# Patient Record
Sex: FEMALE | Race: WHITE | Employment: OTHER | URBAN - METROPOLITAN AREA
[De-identification: names, ages, dates, MRNs, and addresses within clinical notes are randomized per-mention and may not be internally consistent; named-entity substitution may affect disease eponyms.]

---

## 2018-02-14 ENCOUNTER — TELEPHONE (OUTPATIENT)
Dept: FAMILY MEDICINE CLINIC | Age: 63
End: 2018-02-14

## 2018-02-14 NOTE — TELEPHONE ENCOUNTER
Joni Becerra called to see if Dr. Connie Beltrán would be willing to see her as a new patient. Her daughter Dimitri Correa is a current patient of yours and highly recommended you as a PCP. Please let me know if it would be okay with you to schedule her as a new patient. Thank you.

## 2018-03-19 ENCOUNTER — OFFICE VISIT (OUTPATIENT)
Dept: FAMILY MEDICINE CLINIC | Age: 63
End: 2018-03-19

## 2018-03-19 VITALS
BODY MASS INDEX: 28.81 KG/M2 | HEIGHT: 61 IN | DIASTOLIC BLOOD PRESSURE: 82 MMHG | RESPIRATION RATE: 18 BRPM | OXYGEN SATURATION: 97 % | HEART RATE: 70 BPM | SYSTOLIC BLOOD PRESSURE: 134 MMHG | WEIGHT: 152.6 LBS

## 2018-03-19 DIAGNOSIS — I10 ESSENTIAL HYPERTENSION: ICD-10-CM

## 2018-03-19 DIAGNOSIS — M16.0 PRIMARY OSTEOARTHRITIS OF BOTH HIPS: ICD-10-CM

## 2018-03-19 DIAGNOSIS — E78.2 MIXED HYPERLIPIDEMIA: ICD-10-CM

## 2018-03-19 DIAGNOSIS — M48.061 SPINAL STENOSIS OF LUMBAR REGION WITHOUT NEUROGENIC CLAUDICATION: ICD-10-CM

## 2018-03-19 DIAGNOSIS — R53.83 FATIGUE, UNSPECIFIED TYPE: Primary | ICD-10-CM

## 2018-03-19 PROCEDURE — 99203 OFFICE O/P NEW LOW 30 MIN: CPT | Performed by: INTERNAL MEDICINE

## 2018-03-19 RX ORDER — FLUOXETINE 10 MG/1
10 TABLET, FILM COATED ORAL DAILY
COMMUNITY
End: 2018-12-21 | Stop reason: SDUPTHER

## 2018-03-19 ASSESSMENT — ENCOUNTER SYMPTOMS
GASTROINTESTINAL NEGATIVE: 1
ALLERGIC/IMMUNOLOGIC NEGATIVE: 1
RESPIRATORY NEGATIVE: 1
BACK PAIN: 1

## 2018-03-19 NOTE — PROGRESS NOTES
Popliteal pulses are 2+ on the right side, and 2+ on the left side. Dorsalis pedis pulses are 2+ on the right side, and 2+ on the left side. Posterior tibial pulses are 2+ on the right side, and 2+ on the left side. Pulmonary/Chest: Effort normal and breath sounds normal. No accessory muscle usage. No apnea, no tachypnea and no bradypnea. No respiratory distress. She has no decreased breath sounds. She has no wheezes. She has no rhonchi. She has no rales. She exhibits no tenderness. Musculoskeletal: Normal range of motion. She exhibits no edema or tenderness. Lymphadenopathy:     She has no cervical adenopathy. She has no axillary adenopathy. Neurological: She is alert and oriented to person, place, and time. She has normal strength and normal reflexes. She is not disoriented. She displays no atrophy and no tremor. No cranial nerve deficit or sensory deficit. She exhibits normal muscle tone. Coordination normal.   Skin: Skin is warm, dry and intact. No abrasion and no rash noted. She is not diaphoretic. No cyanosis or erythema. No pallor. Nails show no clubbing. Psychiatric: She has a normal mood and affect. Her speech is normal and behavior is normal. Judgment and thought content normal. Cognition and memory are normal.       Assessment:      Lumbar Spinal Stenosis. Chronic. Hld (Hyperlipidemia). Stable w/ diet. Htn (Hypertension). Good w/ diet and wt. Plan:      All above problems reviewed and the found to be unchanged except for the following :     Lumbar Spinal Stenosis. To Pain MD for established care. No med needs at present. Hld (Hyperlipidemia). Continue diet and exercise as tolerated. To lose 10 lbs      Htn (Hypertension). Home BP checks. Call if>140/90. Improve diet. Avoid caffeine and salt. Call if new c/o w/ meds.

## 2018-03-20 ENCOUNTER — TELEPHONE (OUTPATIENT)
Dept: FAMILY MEDICINE CLINIC | Age: 63
End: 2018-03-20

## 2018-03-20 ENCOUNTER — TELEPHONE (OUTPATIENT)
Dept: PAIN MANAGEMENT | Age: 63
End: 2018-03-20

## 2018-03-20 DIAGNOSIS — E78.2 MIXED HYPERLIPIDEMIA: ICD-10-CM

## 2018-03-20 DIAGNOSIS — R53.83 FATIGUE, UNSPECIFIED TYPE: ICD-10-CM

## 2018-03-20 DIAGNOSIS — I10 ESSENTIAL HYPERTENSION: Primary | ICD-10-CM

## 2018-03-23 ENCOUNTER — NURSE ONLY (OUTPATIENT)
Dept: FAMILY MEDICINE CLINIC | Age: 63
End: 2018-03-23

## 2018-03-23 DIAGNOSIS — I10 ESSENTIAL HYPERTENSION: ICD-10-CM

## 2018-03-23 DIAGNOSIS — R53.83 FATIGUE, UNSPECIFIED TYPE: ICD-10-CM

## 2018-03-23 DIAGNOSIS — E78.2 MIXED HYPERLIPIDEMIA: ICD-10-CM

## 2018-03-23 LAB
ALBUMIN SERPL-MCNC: 4.3 G/DL (ref 3.4–5)
ALP BLD-CCNC: 67 U/L (ref 40–129)
ALT SERPL-CCNC: 13 U/L (ref 10–40)
ANION GAP SERPL CALCULATED.3IONS-SCNC: 13 MMOL/L (ref 3–16)
AST SERPL-CCNC: 19 U/L (ref 15–37)
BASOPHILS ABSOLUTE: 0.1 K/UL (ref 0–0.2)
BASOPHILS RELATIVE PERCENT: 1 %
BILIRUB SERPL-MCNC: <0.2 MG/DL (ref 0–1)
BILIRUBIN DIRECT: <0.2 MG/DL (ref 0–0.3)
BILIRUBIN, INDIRECT: NORMAL MG/DL (ref 0–1)
BUN BLDV-MCNC: 15 MG/DL (ref 7–20)
CALCIUM SERPL-MCNC: 9.5 MG/DL (ref 8.3–10.6)
CHLORIDE BLD-SCNC: 102 MMOL/L (ref 99–110)
CHOLESTEROL, TOTAL: 279 MG/DL (ref 0–199)
CO2: 28 MMOL/L (ref 21–32)
CREAT SERPL-MCNC: 0.6 MG/DL (ref 0.6–1.2)
EOSINOPHILS ABSOLUTE: 0.2 K/UL (ref 0–0.6)
EOSINOPHILS RELATIVE PERCENT: 3.6 %
GFR AFRICAN AMERICAN: >60
GFR NON-AFRICAN AMERICAN: >60
GLUCOSE BLD-MCNC: 91 MG/DL (ref 70–99)
HCT VFR BLD CALC: 41.3 % (ref 36–48)
HDLC SERPL-MCNC: 70 MG/DL (ref 40–60)
HEMOGLOBIN: 13.8 G/DL (ref 12–16)
LDL CHOLESTEROL CALCULATED: 194 MG/DL
LYMPHOCYTES ABSOLUTE: 1.9 K/UL (ref 1–5.1)
LYMPHOCYTES RELATIVE PERCENT: 37.4 %
MCH RBC QN AUTO: 31.1 PG (ref 26–34)
MCHC RBC AUTO-ENTMCNC: 33.5 G/DL (ref 31–36)
MCV RBC AUTO: 92.7 FL (ref 80–100)
MONOCYTES ABSOLUTE: 0.4 K/UL (ref 0–1.3)
MONOCYTES RELATIVE PERCENT: 7.2 %
NEUTROPHILS ABSOLUTE: 2.6 K/UL (ref 1.7–7.7)
NEUTROPHILS RELATIVE PERCENT: 50.8 %
PDW BLD-RTO: 12.9 % (ref 12.4–15.4)
PLATELET # BLD: 252 K/UL (ref 135–450)
PMV BLD AUTO: 8.7 FL (ref 5–10.5)
POTASSIUM SERPL-SCNC: 4.2 MMOL/L (ref 3.5–5.1)
RBC # BLD: 4.46 M/UL (ref 4–5.2)
SODIUM BLD-SCNC: 143 MMOL/L (ref 136–145)
TOTAL PROTEIN: 6.4 G/DL (ref 6.4–8.2)
TRIGL SERPL-MCNC: 76 MG/DL (ref 0–150)
TSH SERPL DL<=0.05 MIU/L-ACNC: 3.46 UIU/ML (ref 0.27–4.2)
VLDLC SERPL CALC-MCNC: 15 MG/DL
WBC # BLD: 5.1 K/UL (ref 4–11)

## 2018-03-23 PROCEDURE — 36415 COLL VENOUS BLD VENIPUNCTURE: CPT | Performed by: INTERNAL MEDICINE

## 2018-03-26 RX ORDER — PRAVASTATIN SODIUM 20 MG
20 TABLET ORAL EVERY EVENING
Qty: 90 TABLET | Refills: 1 | Status: SHIPPED | OUTPATIENT
Start: 2018-03-26 | End: 2018-09-27 | Stop reason: SINTOL

## 2018-03-26 NOTE — TELEPHONE ENCOUNTER
----- Message from Mary Hobbs MD sent at 3/25/2018  1:02 PM EDT -----  Cholesterol is very high. Pravastatin 20 mg one daily. rechx labs 6 mo.  Only 1 of 2 statins not assoc w/ muscle pain but also weakest.

## 2018-04-03 ENCOUNTER — OFFICE VISIT (OUTPATIENT)
Dept: PAIN MANAGEMENT | Age: 63
End: 2018-04-03

## 2018-04-03 VITALS
HEART RATE: 80 BPM | WEIGHT: 153.4 LBS | BODY MASS INDEX: 28.96 KG/M2 | DIASTOLIC BLOOD PRESSURE: 84 MMHG | SYSTOLIC BLOOD PRESSURE: 139 MMHG | HEIGHT: 61 IN

## 2018-04-03 DIAGNOSIS — M25.559 CHRONIC HIP PAIN, UNSPECIFIED LATERALITY: ICD-10-CM

## 2018-04-03 DIAGNOSIS — M96.1 POSTLAMINECTOMY SYNDROME OF CERVICAL REGION: ICD-10-CM

## 2018-04-03 DIAGNOSIS — G89.29 CHRONIC HIP PAIN, UNSPECIFIED LATERALITY: ICD-10-CM

## 2018-04-03 DIAGNOSIS — G89.29 OTHER CHRONIC PAIN: ICD-10-CM

## 2018-04-03 DIAGNOSIS — M96.1 POSTLAMINECTOMY SYNDROME OF LUMBAR REGION: Primary | ICD-10-CM

## 2018-04-03 PROCEDURE — 3014F SCREEN MAMMO DOC REV: CPT | Performed by: ANESTHESIOLOGY

## 2018-04-03 PROCEDURE — 99204 OFFICE O/P NEW MOD 45 MIN: CPT | Performed by: ANESTHESIOLOGY

## 2018-04-03 PROCEDURE — 1036F TOBACCO NON-USER: CPT | Performed by: ANESTHESIOLOGY

## 2018-04-03 PROCEDURE — G8419 CALC BMI OUT NRM PARAM NOF/U: HCPCS | Performed by: ANESTHESIOLOGY

## 2018-04-03 PROCEDURE — G8427 DOCREV CUR MEDS BY ELIG CLIN: HCPCS | Performed by: ANESTHESIOLOGY

## 2018-04-03 PROCEDURE — 3017F COLORECTAL CA SCREEN DOC REV: CPT | Performed by: ANESTHESIOLOGY

## 2018-04-03 RX ORDER — GABAPENTIN 400 MG/1
400 CAPSULE ORAL 3 TIMES DAILY
Qty: 270 CAPSULE | Refills: 0 | Status: SHIPPED | OUTPATIENT
Start: 2018-04-03 | End: 2018-08-27 | Stop reason: SDUPTHER

## 2018-04-03 RX ORDER — TIZANIDINE 4 MG/1
4 TABLET ORAL 2 TIMES DAILY PRN
Qty: 60 TABLET | Refills: 1 | Status: SHIPPED | OUTPATIENT
Start: 2018-04-03 | End: 2018-08-28 | Stop reason: SDUPTHER

## 2018-04-03 ASSESSMENT — PATIENT HEALTH QUESTIONNAIRE - PHQ9
1. LITTLE INTEREST OR PLEASURE IN DOING THINGS: 0
SUM OF ALL RESPONSES TO PHQ QUESTIONS 1-9: 0
SUM OF ALL RESPONSES TO PHQ9 QUESTIONS 1 & 2: 0
2. FEELING DOWN, DEPRESSED OR HOPELESS: 0

## 2018-04-09 ENCOUNTER — TELEPHONE (OUTPATIENT)
Dept: FAMILY MEDICINE CLINIC | Age: 63
End: 2018-04-09

## 2018-04-12 ENCOUNTER — TELEPHONE (OUTPATIENT)
Dept: PAIN MANAGEMENT | Age: 63
End: 2018-04-12

## 2018-04-12 DIAGNOSIS — M47.896 OTHER OSTEOARTHRITIS OF SPINE, LUMBAR REGION: Primary | ICD-10-CM

## 2018-04-12 RX ORDER — MELOXICAM 7.5 MG/1
7.5 TABLET ORAL DAILY PRN
Qty: 30 TABLET | Refills: 2 | Status: SHIPPED | OUTPATIENT
Start: 2018-04-12 | End: 2018-06-11 | Stop reason: SDUPTHER

## 2018-04-16 ENCOUNTER — OFFICE VISIT (OUTPATIENT)
Dept: ORTHOPEDIC SURGERY | Age: 63
End: 2018-04-16

## 2018-04-16 VITALS
DIASTOLIC BLOOD PRESSURE: 92 MMHG | HEIGHT: 61 IN | WEIGHT: 150 LBS | HEART RATE: 75 BPM | BODY MASS INDEX: 28.32 KG/M2 | SYSTOLIC BLOOD PRESSURE: 159 MMHG

## 2018-04-16 DIAGNOSIS — Z96.643 HISTORY OF TOTAL HIP REPLACEMENT, BILATERAL: Primary | ICD-10-CM

## 2018-04-16 DIAGNOSIS — R52 PAIN: ICD-10-CM

## 2018-04-16 PROCEDURE — 1036F TOBACCO NON-USER: CPT | Performed by: ORTHOPAEDIC SURGERY

## 2018-04-16 PROCEDURE — G8419 CALC BMI OUT NRM PARAM NOF/U: HCPCS | Performed by: ORTHOPAEDIC SURGERY

## 2018-04-16 PROCEDURE — 3014F SCREEN MAMMO DOC REV: CPT | Performed by: ORTHOPAEDIC SURGERY

## 2018-04-16 PROCEDURE — 3017F COLORECTAL CA SCREEN DOC REV: CPT | Performed by: ORTHOPAEDIC SURGERY

## 2018-04-16 PROCEDURE — 99203 OFFICE O/P NEW LOW 30 MIN: CPT | Performed by: ORTHOPAEDIC SURGERY

## 2018-04-16 PROCEDURE — G8427 DOCREV CUR MEDS BY ELIG CLIN: HCPCS | Performed by: ORTHOPAEDIC SURGERY

## 2018-05-01 ENCOUNTER — TELEPHONE (OUTPATIENT)
Dept: FAMILY MEDICINE CLINIC | Age: 63
End: 2018-05-01

## 2018-06-11 DIAGNOSIS — M47.896 OTHER OSTEOARTHRITIS OF SPINE, LUMBAR REGION: ICD-10-CM

## 2018-06-11 RX ORDER — MELOXICAM 7.5 MG/1
15 TABLET ORAL DAILY PRN
Qty: 60 TABLET | Refills: 1 | Status: SHIPPED | OUTPATIENT
Start: 2018-06-11 | End: 2018-10-18 | Stop reason: SDUPTHER

## 2018-06-13 RX ORDER — TIZANIDINE 4 MG/1
4 TABLET ORAL 2 TIMES DAILY
Qty: 60 TABLET | Refills: 0 | Status: SHIPPED | OUTPATIENT
Start: 2018-06-13 | End: 2019-01-09

## 2018-06-13 RX ORDER — TIZANIDINE 4 MG/1
TABLET ORAL
Refills: 1 | COMMUNITY
Start: 2018-06-11 | End: 2018-06-13 | Stop reason: SDUPTHER

## 2018-08-28 DIAGNOSIS — M25.559 CHRONIC HIP PAIN, UNSPECIFIED LATERALITY: ICD-10-CM

## 2018-08-28 DIAGNOSIS — M96.1 POSTLAMINECTOMY SYNDROME OF LUMBAR REGION: ICD-10-CM

## 2018-08-28 DIAGNOSIS — G89.29 CHRONIC HIP PAIN, UNSPECIFIED LATERALITY: ICD-10-CM

## 2018-08-28 DIAGNOSIS — M96.1 POSTLAMINECTOMY SYNDROME OF CERVICAL REGION: ICD-10-CM

## 2018-08-28 RX ORDER — TIZANIDINE 4 MG/1
4 TABLET ORAL 2 TIMES DAILY PRN
Qty: 60 TABLET | Refills: 1 | Status: SHIPPED | OUTPATIENT
Start: 2018-08-28 | End: 2018-09-27 | Stop reason: ALTCHOICE

## 2018-09-04 ENCOUNTER — TELEPHONE (OUTPATIENT)
Dept: PAIN MANAGEMENT | Age: 63
End: 2018-09-04

## 2018-09-04 DIAGNOSIS — M25.559 CHRONIC HIP PAIN, UNSPECIFIED LATERALITY: ICD-10-CM

## 2018-09-04 DIAGNOSIS — M96.1 POSTLAMINECTOMY SYNDROME OF LUMBAR REGION: ICD-10-CM

## 2018-09-04 DIAGNOSIS — M96.1 POSTLAMINECTOMY SYNDROME OF CERVICAL REGION: ICD-10-CM

## 2018-09-04 DIAGNOSIS — G89.29 CHRONIC HIP PAIN, UNSPECIFIED LATERALITY: ICD-10-CM

## 2018-09-04 NOTE — TELEPHONE ENCOUNTER
MEDICATION ISSUES    ALL MEDICATION REFILLS NEED 48-HOUR ADVANCED NOTICE - Not filled on weekends or holidays.      Issue:  Medication Refill   Requesting Refill: Yes    Medication requested: Gabapentin   Reporting Side effects: No / If other, Describe: N/A   Last date filled: N/A   OARRS done: No   Follow-up Appointment date: No     Correct PHARMACY updated with patient: Yes, [facility]

## 2018-09-18 ENCOUNTER — TELEPHONE (OUTPATIENT)
Dept: FAMILY MEDICINE CLINIC | Age: 63
End: 2018-09-18

## 2018-09-18 DIAGNOSIS — E78.2 MIXED HYPERLIPIDEMIA: Primary | ICD-10-CM

## 2018-09-18 DIAGNOSIS — I10 ESSENTIAL HYPERTENSION: ICD-10-CM

## 2018-09-25 ENCOUNTER — NURSE ONLY (OUTPATIENT)
Dept: FAMILY MEDICINE CLINIC | Age: 63
End: 2018-09-25
Payer: MEDICARE

## 2018-09-25 DIAGNOSIS — I10 ESSENTIAL HYPERTENSION: ICD-10-CM

## 2018-09-25 DIAGNOSIS — E78.2 MIXED HYPERLIPIDEMIA: ICD-10-CM

## 2018-09-25 LAB
ALBUMIN SERPL-MCNC: 4.4 G/DL (ref 3.4–5)
ALP BLD-CCNC: 73 U/L (ref 40–129)
ALT SERPL-CCNC: 16 U/L (ref 10–40)
ANION GAP SERPL CALCULATED.3IONS-SCNC: 12 MMOL/L (ref 3–16)
AST SERPL-CCNC: 23 U/L (ref 15–37)
BILIRUB SERPL-MCNC: <0.2 MG/DL (ref 0–1)
BILIRUBIN DIRECT: <0.2 MG/DL (ref 0–0.3)
BILIRUBIN, INDIRECT: NORMAL MG/DL (ref 0–1)
BUN BLDV-MCNC: 14 MG/DL (ref 7–20)
CALCIUM SERPL-MCNC: 9.7 MG/DL (ref 8.3–10.6)
CHLORIDE BLD-SCNC: 103 MMOL/L (ref 99–110)
CHOLESTEROL, TOTAL: 288 MG/DL (ref 0–199)
CO2: 27 MMOL/L (ref 21–32)
CREAT SERPL-MCNC: 0.6 MG/DL (ref 0.6–1.2)
GFR AFRICAN AMERICAN: >60
GFR NON-AFRICAN AMERICAN: >60
GLUCOSE BLD-MCNC: 93 MG/DL (ref 70–99)
HCT VFR BLD CALC: 40.4 % (ref 36–48)
HDLC SERPL-MCNC: 75 MG/DL (ref 40–60)
HEMOGLOBIN: 13.5 G/DL (ref 12–16)
LDL CHOLESTEROL CALCULATED: 191 MG/DL
MCH RBC QN AUTO: 30.2 PG (ref 26–34)
MCHC RBC AUTO-ENTMCNC: 33.3 G/DL (ref 31–36)
MCV RBC AUTO: 90.7 FL (ref 80–100)
PDW BLD-RTO: 13.3 % (ref 12.4–15.4)
PHOSPHORUS: 4.2 MG/DL (ref 2.5–4.9)
PLATELET # BLD: 271 K/UL (ref 135–450)
PMV BLD AUTO: 8.5 FL (ref 5–10.5)
POTASSIUM SERPL-SCNC: 4.6 MMOL/L (ref 3.5–5.1)
RBC # BLD: 4.45 M/UL (ref 4–5.2)
SODIUM BLD-SCNC: 142 MMOL/L (ref 136–145)
TOTAL PROTEIN: 6.7 G/DL (ref 6.4–8.2)
TRIGL SERPL-MCNC: 109 MG/DL (ref 0–150)
TSH SERPL DL<=0.05 MIU/L-ACNC: 2.83 UIU/ML (ref 0.27–4.2)
VLDLC SERPL CALC-MCNC: 22 MG/DL
WBC # BLD: 5.1 K/UL (ref 4–11)

## 2018-09-25 PROCEDURE — 36415 COLL VENOUS BLD VENIPUNCTURE: CPT | Performed by: INTERNAL MEDICINE

## 2018-09-27 ENCOUNTER — OFFICE VISIT (OUTPATIENT)
Dept: FAMILY MEDICINE CLINIC | Age: 63
End: 2018-09-27
Payer: MEDICARE

## 2018-09-27 VITALS
WEIGHT: 152.8 LBS | HEART RATE: 86 BPM | DIASTOLIC BLOOD PRESSURE: 78 MMHG | SYSTOLIC BLOOD PRESSURE: 124 MMHG | HEIGHT: 61 IN | OXYGEN SATURATION: 98 % | BODY MASS INDEX: 28.85 KG/M2 | RESPIRATION RATE: 16 BRPM

## 2018-09-27 DIAGNOSIS — M48.061 SPINAL STENOSIS OF LUMBAR REGION WITHOUT NEUROGENIC CLAUDICATION: ICD-10-CM

## 2018-09-27 DIAGNOSIS — Z23 PNEUMOCOCCAL VACCINATION ADMINISTERED AT CURRENT VISIT: ICD-10-CM

## 2018-09-27 DIAGNOSIS — Z00.00 MEDICARE ANNUAL WELLNESS VISIT, SUBSEQUENT: Primary | ICD-10-CM

## 2018-09-27 DIAGNOSIS — I10 ESSENTIAL HYPERTENSION: ICD-10-CM

## 2018-09-27 DIAGNOSIS — Z12.11 COLON CANCER SCREENING: ICD-10-CM

## 2018-09-27 DIAGNOSIS — E28.39 ESTROGEN DEFICIENCY: ICD-10-CM

## 2018-09-27 DIAGNOSIS — Z00.00 ROUTINE GENERAL MEDICAL EXAMINATION AT A HEALTH CARE FACILITY: ICD-10-CM

## 2018-09-27 DIAGNOSIS — M16.0 PRIMARY OSTEOARTHRITIS OF BOTH HIPS: ICD-10-CM

## 2018-09-27 DIAGNOSIS — Z23 FLU VACCINE NEED: ICD-10-CM

## 2018-09-27 DIAGNOSIS — E78.2 MIXED HYPERLIPIDEMIA: ICD-10-CM

## 2018-09-27 PROCEDURE — 90686 IIV4 VACC NO PRSV 0.5 ML IM: CPT | Performed by: INTERNAL MEDICINE

## 2018-09-27 PROCEDURE — G0009 ADMIN PNEUMOCOCCAL VACCINE: HCPCS | Performed by: INTERNAL MEDICINE

## 2018-09-27 PROCEDURE — G0438 PPPS, INITIAL VISIT: HCPCS | Performed by: INTERNAL MEDICINE

## 2018-09-27 PROCEDURE — 90670 PCV13 VACCINE IM: CPT | Performed by: INTERNAL MEDICINE

## 2018-09-27 PROCEDURE — G0008 ADMIN INFLUENZA VIRUS VAC: HCPCS | Performed by: INTERNAL MEDICINE

## 2018-09-27 ASSESSMENT — PATIENT HEALTH QUESTIONNAIRE - PHQ9
SUM OF ALL RESPONSES TO PHQ QUESTIONS 1-9: 0
SUM OF ALL RESPONSES TO PHQ QUESTIONS 1-9: 0

## 2018-09-27 ASSESSMENT — LIFESTYLE VARIABLES
HOW OFTEN DO YOU HAVE SIX OR MORE DRINKS ON ONE OCCASION: 0
AUDIT-C TOTAL SCORE: 3
HOW MANY STANDARD DRINKS CONTAINING ALCOHOL DO YOU HAVE ON A TYPICAL DAY: 0
HOW OFTEN DO YOU HAVE A DRINK CONTAINING ALCOHOL: 3

## 2018-09-27 ASSESSMENT — ENCOUNTER SYMPTOMS
ALLERGIC/IMMUNOLOGIC NEGATIVE: 1
GASTROINTESTINAL NEGATIVE: 1
RESPIRATORY NEGATIVE: 1
EYES NEGATIVE: 1

## 2018-09-27 ASSESSMENT — ANXIETY QUESTIONNAIRES: GAD7 TOTAL SCORE: 1

## 2018-09-27 NOTE — PATIENT INSTRUCTIONS
All above problems reviewed and the found to be unchanged except for the following :     Medicare Annual Wellness Visit, Subsequent. Due for Prenar 13 and Flu shot. Rx for DEXA and Colonoscopy. Lumbar Spinal Stenosis. To pain MD. Regular exercise. Wt reduction. Primary Osteoarthritis of Both Hips. meds as needed. Hld (Hyperlipidemia). Will try Livalo 2 mg. Htn (Hypertension). Home BP . Call if >140/90           Personalized Preventive Plan for Adenike Killian - 9/27/2018  Medicare offers a range of preventive health benefits. Some of the tests and screenings are paid in full while other may be subject to a deductible, co-insurance, and/or copay. Some of these benefits include a comprehensive review of your medical history including lifestyle, illnesses that may run in your family, and various assessments and screenings as appropriate. After reviewing your medical record and screening and assessments performed today your provider may have ordered immunizations, labs, imaging, and/or referrals for you. A list of these orders (if applicable) as well as your Preventive Care list are included within your After Visit Summary for your review. Other Preventive Recommendations:    · A preventive eye exam performed by an eye specialist is recommended every 1-2 years to screen for glaucoma; cataracts, macular degeneration, and other eye disorders. · A preventive dental visit is recommended every 6 months. · Try to get at least 150 minutes of exercise per week or 10,000 steps per day on a pedometer . · Order or download the FREE \"Exercise & Physical Activity: Your Everyday Guide\" from [employer]. Call 3-917.732.2497 or search [employer] online. · You need 7442-2822 mg of calcium and 2092-0079 IU of vitamin D per day.  It is possible to meet your calcium requirement with diet alone, but a vitamin D supplement is usually necessary to meet this goal.  · When exposed to the sun, use a sunscreen that protects against both UVA and UVB radiation with an SPF of 30 or greater. Reapply every 2 to 3 hours or after sweating, drying off with a towel, or swimming. · Always wear a seat belt when traveling in a car. Always wear a helmet when riding a bicycle or motorcycle.

## 2018-09-27 NOTE — PROGRESS NOTES
tenderness and no frontal sinus tenderness. Mouth/Throat: Uvula is midline, oropharynx is clear and moist and mucous membranes are normal. No oropharyngeal exudate. Eyes: Pupils are equal, round, and reactive to light. Conjunctivae, EOM and lids are normal.   Fundoscopic exam:       The right eye shows no arteriolar narrowing, no AV nicking, no exudate, no hemorrhage and no papilledema. The right eye shows no red reflex and no venous pulsations. The left eye shows no arteriolar narrowing, no AV nicking, no exudate, no hemorrhage and no papilledema. The left eye shows no red reflex and no venous pulsations. Neck: Trachea normal, normal range of motion, full passive range of motion without pain and phonation normal. Neck supple. Normal carotid pulses, no hepatojugular reflux and no JVD present. Carotid bruit is not present. No tracheal deviation present. No thyromegaly present. Cardiovascular: Normal rate, regular rhythm, normal heart sounds, intact distal pulses and normal pulses. No extrasystoles are present. PMI is not displaced. Exam reveals no gallop and no friction rub. No murmur heard. Pulses:       Carotid pulses are 2+ on the right side, and 2+ on the left side. Radial pulses are 2+ on the right side, and 2+ on the left side. Femoral pulses are 2+ on the right side, and 2+ on the left side. Popliteal pulses are 2+ on the right side, and 2+ on the left side. Dorsalis pedis pulses are 2+ on the right side, and 2+ on the left side. Posterior tibial pulses are 2+ on the right side, and 2+ on the left side. Pulmonary/Chest: Effort normal and breath sounds normal. No stridor. No respiratory distress. She has no decreased breath sounds. She has no wheezes. She has no rhonchi. She has no rales. She exhibits no tenderness. Abdominal: Soft.  Normal appearance, normal aorta and bowel sounds are normal. She exhibits no shifting dullness, no distension, no pulsatile liver, no fluid wave, no abdominal bruit, no ascites, no pulsatile midline mass and no mass. There is no hepatosplenomegaly. There is no tenderness. There is no rebound, no guarding and no CVA tenderness. No hernia. Hernia confirmed negative in the ventral area. Genitourinary: No breast swelling, tenderness, discharge or bleeding. Pelvic exam was performed with patient supine. Genitourinary Comments: NE   Musculoskeletal: Normal range of motion. She exhibits tenderness (s/p bilateral hip relacement. mild pain R ankle. chronic back pain. ). She exhibits no edema. Lymphadenopathy:        Head (right side): No submental, no submandibular, no tonsillar, no preauricular, no posterior auricular and no occipital adenopathy present. Head (left side): No submental, no submandibular, no tonsillar, no preauricular, no posterior auricular and no occipital adenopathy present. She has no cervical adenopathy. She has no axillary adenopathy. Right: No inguinal, no supraclavicular and no epitrochlear adenopathy present. Left: No inguinal, no supraclavicular and no epitrochlear adenopathy present. Neurological: She is alert and oriented to person, place, and time. She has normal strength and normal reflexes. She is not disoriented. She displays no atrophy, no tremor and normal reflexes. No cranial nerve deficit or sensory deficit. She exhibits normal muscle tone. She displays a negative Romberg sign. She displays no seizure activity. Coordination and gait normal.   Reflex Scores:       Tricep reflexes are 2+ on the right side and 2+ on the left side. Bicep reflexes are 2+ on the right side and 2+ on the left side. Brachioradialis reflexes are 2+ on the right side and 2+ on the left side. Patellar reflexes are 2+ on the right side and 2+ on the left side. Achilles reflexes are 2+ on the right side and 2+ on the left side. Skin: Skin is warm, dry and intact.  No Intolerant to all other Statins. Cardiac risk>10%. Yes Britt aClvo MD   gabapentin (NEURONTIN) 400 MG capsule TAKE ONE CAPSULE BY MOUTH 3 TIMES A DAY Yes Ricardo De Paz MD   tiZANidine (ZANAFLEX) 4 MG tablet Take 1 tablet by mouth 2 times daily Yes Ricardo De Paz MD   meloxicam (MOBIC) 7.5 MG tablet Take 2 tablets by mouth daily as needed for Pain Yes Ricardo De Paz MD   FLUoxetine (PROZAC) 10 MG tablet Take 10 mg by mouth daily Yes Historical Provider, MD   aspirin 81 MG tablet Take 81 mg by mouth daily Yes Historical Provider, MD Garcia [place] [phone] MG TABS Take by mouth Yes Historical Provider, MD   Multiple Vitamins-Minerals (THERAPEUTIC MULTIVITAMIN-MINERALS) tablet Take 1 tablet by mouth daily Yes Historical Provider, MD Garcia Blind Oil 1000 MG CAPS Take 1 capsule by mouth daily Yes Historical Provider, MD     Past Medical History:   Diagnosis Date    Cancer ([facility] [address]     basal cell on left upper chest    Hemangioma     T10    Hyperlipidemia     Hypertension     Osteoarthritis     Shingles     Severe 1985 and 1989 back and right hip, right leg.      Past Surgical History:   Procedure Laterality Date    BREAST SURGERY      cyst right    ENDOMETRIAL ABLATION      uterine    FOOT SURGERY      neuroma left foot    KNEE ARTHROSCOPY      right    SHOULDER SURGERY Right     rotator cuff    SKIN CANCER EXCISION  October 2015    left anterior upper chest    SKIN CANCER EXCISION  2-2016    mid chest area   [facility] SURGERY  January 27 2014    lumbar laminectomy, Dr. Moustapha Waddell  April 2001    cervical     TUMOR REMOVAL      left rib     Family History   Problem Relation Age of Onset    Heart Disease Mother     Cancer Mother         cervix and parathyroid    Arthritis Mother     Heart Disease Father     Cancer Father         lung and brain mets    Arthritis Sister     Arthritis Brother        CareTeam (Including outside providers/suppliers regularly involved

## 2018-10-18 DIAGNOSIS — M47.896 OTHER OSTEOARTHRITIS OF SPINE, LUMBAR REGION: ICD-10-CM

## 2018-10-18 RX ORDER — MELOXICAM 7.5 MG/1
15 TABLET ORAL DAILY PRN
Qty: 60 TABLET | Refills: 1 | Status: SHIPPED | OUTPATIENT
Start: 2018-10-18 | End: 2018-10-23

## 2018-10-23 DIAGNOSIS — M47.896 OTHER OSTEOARTHRITIS OF SPINE, LUMBAR REGION: ICD-10-CM

## 2018-10-23 RX ORDER — MELOXICAM 15 MG/1
15 TABLET ORAL DAILY
Qty: 30 TABLET | Refills: 3 | Status: SHIPPED | OUTPATIENT
Start: 2018-10-23 | End: 2019-01-09 | Stop reason: SDUPTHER

## 2018-10-27 PROBLEM — Z00.00 MEDICARE ANNUAL WELLNESS VISIT, SUBSEQUENT: Status: RESOLVED | Noted: 2018-09-27 | Resolved: 2018-10-27

## 2018-11-13 ENCOUNTER — HOSPITAL ENCOUNTER (OUTPATIENT)
Dept: WOMENS IMAGING | Age: 63
Discharge: HOME | End: 2018-11-13
Payer: MEDICARE

## 2018-11-13 ENCOUNTER — TELEPHONE (OUTPATIENT)
Dept: FAMILY MEDICINE CLINIC | Age: 63
End: 2018-11-13

## 2018-11-13 DIAGNOSIS — Z12.31 ENCOUNTER FOR SCREENING MAMMOGRAM FOR BREAST CANCER: ICD-10-CM

## 2018-11-13 DIAGNOSIS — E28.39 ESTROGEN DEFICIENCY: ICD-10-CM

## 2018-11-13 PROCEDURE — 77080 DXA BONE DENSITY AXIAL: CPT

## 2018-11-13 PROCEDURE — 77067 SCR MAMMO BI INCL CAD: CPT

## 2018-11-13 PROCEDURE — 77063 BREAST TOMOSYNTHESIS BI: CPT

## 2018-11-13 NOTE — TELEPHONE ENCOUNTER
Patient stated that Dr. Jessica Garcia wanted her on a statin drug. - she said none of them were working and she started on Definigen - she said the  that sold it to her seemed knowledgeable about it. She would like to know if you would order her a cholesterol test for sometime in December to see if it is working as the  said it would.      Please advise [phone]

## 2018-11-13 NOTE — TELEPHONE ENCOUNTER
Red Rice Yeast has not been shown to decrease Cardiac and stroke risk. Cholesterol is very high. May try for 6 week and then rechx but will most likely require medication that has proven to decrease risk of MI and Stroke by ~50%.

## 2018-11-26 ENCOUNTER — TELEPHONE (OUTPATIENT)
Dept: FAMILY MEDICINE CLINIC | Age: 63
End: 2018-11-26

## 2018-11-26 DIAGNOSIS — E78.2 MIXED HYPERLIPIDEMIA: Primary | ICD-10-CM

## 2018-11-27 DIAGNOSIS — M25.559 CHRONIC HIP PAIN, UNSPECIFIED LATERALITY: ICD-10-CM

## 2018-11-27 DIAGNOSIS — M96.1 POSTLAMINECTOMY SYNDROME OF LUMBAR REGION: ICD-10-CM

## 2018-11-27 DIAGNOSIS — M96.1 POSTLAMINECTOMY SYNDROME OF CERVICAL REGION: ICD-10-CM

## 2018-11-27 DIAGNOSIS — G89.29 CHRONIC HIP PAIN, UNSPECIFIED LATERALITY: ICD-10-CM

## 2018-11-27 RX ORDER — GABAPENTIN 400 MG/1
CAPSULE ORAL
Qty: 270 CAPSULE | Refills: 0 | OUTPATIENT
Start: 2018-11-27 | End: 2019-02-25

## 2018-11-27 NOTE — TELEPHONE ENCOUNTER
MEDICATION ISSUES     Reported Issue:  Medication Refill    Medication Information     - Refill medication requested: Gabapentin      - Medication dosage and quantity: 400mg [id number]     - Reporting side effects, if any: No     - Other issues, if any: None      Controlled Substances     - OARRS done: NA     - Last refill date (per OARRS): n/a      - Follow-up Appointment date: None    Pharmacy Information      - PHARMACY updated with patient: NA     - PHARMACY updated in Chart: Yes      NOTE for Controlled Substances     PLEASE READ TO PATIENTS:    1. For routine refills: ALL MEDICATION REFILLS NEED 2 WORKING DAYS (48-HOUR) ADVANCED NOTICE - Not filled on weekends or holidays.     - Patient informed about the above policy:  NA    2. If switching or changing opioid pain medications -    PATIENTS NEED TO BRING OLD MEDICATION FOR PILL COUNT AND POSSIBLE DESTRUCTION - before new medication could be filled.      - Patient informed about the above policy:  NA

## 2018-11-27 NOTE — TELEPHONE ENCOUNTER
Pt dose not need a med refill. Pt states that she is not sure why the pharmacy keeps sending refill request for her. Pt states that she is will call and make an appt when she is almost out.

## 2018-11-28 DIAGNOSIS — G89.29 CHRONIC HIP PAIN, UNSPECIFIED LATERALITY: ICD-10-CM

## 2018-11-28 DIAGNOSIS — M96.1 POSTLAMINECTOMY SYNDROME OF CERVICAL REGION: ICD-10-CM

## 2018-11-28 DIAGNOSIS — M25.559 CHRONIC HIP PAIN, UNSPECIFIED LATERALITY: ICD-10-CM

## 2018-11-28 DIAGNOSIS — M96.1 POSTLAMINECTOMY SYNDROME OF LUMBAR REGION: ICD-10-CM

## 2018-11-28 RX ORDER — GABAPENTIN 400 MG/1
CAPSULE ORAL
Qty: 270 CAPSULE | Refills: 0 | OUTPATIENT
Start: 2018-11-28 | End: 2019-02-26

## 2018-12-11 ENCOUNTER — HOSPITAL ENCOUNTER (OUTPATIENT)
Dept: GENERAL RADIOLOGY | Age: 63
Discharge: HOME | End: 2018-12-11
Payer: MEDICARE

## 2018-12-11 ENCOUNTER — OFFICE VISIT (OUTPATIENT)
Dept: FAMILY MEDICINE CLINIC | Age: 63
End: 2018-12-11
Payer: MEDICARE

## 2018-12-11 VITALS
DIASTOLIC BLOOD PRESSURE: 60 MMHG | BODY MASS INDEX: 28.51 KG/M2 | SYSTOLIC BLOOD PRESSURE: 138 MMHG | WEIGHT: 151 LBS | HEIGHT: 61 IN | OXYGEN SATURATION: 98 % | HEART RATE: 107 BPM | RESPIRATION RATE: 16 BRPM

## 2018-12-11 DIAGNOSIS — G89.29 CHRONIC RIGHT SHOULDER PAIN: Primary | ICD-10-CM

## 2018-12-11 DIAGNOSIS — G89.29 CHRONIC RIGHT SHOULDER PAIN: ICD-10-CM

## 2018-12-11 DIAGNOSIS — M25.511 CHRONIC RIGHT SHOULDER PAIN: Primary | ICD-10-CM

## 2018-12-11 DIAGNOSIS — M25.511 CHRONIC RIGHT SHOULDER PAIN: ICD-10-CM

## 2018-12-11 PROCEDURE — G8482 FLU IMMUNIZE ORDER/ADMIN: HCPCS | Performed by: INTERNAL MEDICINE

## 2018-12-11 PROCEDURE — 99213 OFFICE O/P EST LOW 20 MIN: CPT | Performed by: INTERNAL MEDICINE

## 2018-12-11 PROCEDURE — G8427 DOCREV CUR MEDS BY ELIG CLIN: HCPCS | Performed by: INTERNAL MEDICINE

## 2018-12-11 PROCEDURE — 1036F TOBACCO NON-USER: CPT | Performed by: INTERNAL MEDICINE

## 2018-12-11 PROCEDURE — G8419 CALC BMI OUT NRM PARAM NOF/U: HCPCS | Performed by: INTERNAL MEDICINE

## 2018-12-11 PROCEDURE — 73030 X-RAY EXAM OF SHOULDER: CPT

## 2018-12-11 PROCEDURE — 3017F COLORECTAL CA SCREEN DOC REV: CPT | Performed by: INTERNAL MEDICINE

## 2018-12-11 ASSESSMENT — ENCOUNTER SYMPTOMS
RESPIRATORY NEGATIVE: 1
ALLERGIC/IMMUNOLOGIC NEGATIVE: 1

## 2018-12-13 ENCOUNTER — TELEPHONE (OUTPATIENT)
Dept: FAMILY MEDICINE CLINIC | Age: 63
End: 2018-12-13

## 2018-12-13 DIAGNOSIS — G89.29 CHRONIC LEFT SHOULDER PAIN: Primary | ICD-10-CM

## 2018-12-13 DIAGNOSIS — M25.512 CHRONIC LEFT SHOULDER PAIN: Primary | ICD-10-CM

## 2018-12-14 ENCOUNTER — NURSE ONLY (OUTPATIENT)
Dept: FAMILY MEDICINE CLINIC | Age: 63
End: 2018-12-14
Payer: MEDICARE

## 2018-12-14 DIAGNOSIS — E78.2 MIXED HYPERLIPIDEMIA: ICD-10-CM

## 2018-12-14 LAB
ALBUMIN SERPL-MCNC: 4.3 G/DL (ref 3.4–5)
ALP BLD-CCNC: 70 U/L (ref 40–129)
ALT SERPL-CCNC: 15 U/L (ref 10–40)
AST SERPL-CCNC: 20 U/L (ref 15–37)
BILIRUB SERPL-MCNC: 0.3 MG/DL (ref 0–1)
BILIRUBIN DIRECT: <0.2 MG/DL (ref 0–0.3)
BILIRUBIN, INDIRECT: NORMAL MG/DL (ref 0–1)
CHOLESTEROL, TOTAL: 294 MG/DL (ref 0–199)
HDLC SERPL-MCNC: 64 MG/DL (ref 40–60)
LDL CHOLESTEROL CALCULATED: 201 MG/DL
TOTAL PROTEIN: 6.7 G/DL (ref 6.4–8.2)
TRIGL SERPL-MCNC: 143 MG/DL (ref 0–150)
VLDLC SERPL CALC-MCNC: 29 MG/DL

## 2018-12-14 PROCEDURE — 36415 COLL VENOUS BLD VENIPUNCTURE: CPT | Performed by: INTERNAL MEDICINE

## 2018-12-17 ENCOUNTER — TELEPHONE (OUTPATIENT)
Dept: FAMILY MEDICINE CLINIC | Age: 63
End: 2018-12-17

## 2018-12-17 RX ORDER — ROSUVASTATIN CALCIUM 5 MG/1
5 TABLET, COATED ORAL NIGHTLY
Qty: 30 TABLET | Refills: 3 | Status: SHIPPED | OUTPATIENT
Start: 2018-12-17 | End: 2019-04-03 | Stop reason: SDUPTHER

## 2018-12-21 RX ORDER — FLUOXETINE 10 MG/1
10 TABLET, FILM COATED ORAL DAILY
Qty: 90 TABLET | Refills: 2 | Status: SHIPPED | OUTPATIENT
Start: 2018-12-21

## 2019-01-02 ENCOUNTER — OFFICE VISIT (OUTPATIENT)
Dept: ORTHOPEDIC SURGERY | Age: 64
End: 2019-01-02
Payer: MEDICARE

## 2019-01-02 VITALS — WEIGHT: 150 LBS | BODY MASS INDEX: 28.32 KG/M2 | HEIGHT: 61 IN

## 2019-01-02 DIAGNOSIS — M19.012 ARTHRITIS OF LEFT SHOULDER REGION: ICD-10-CM

## 2019-01-02 DIAGNOSIS — M25.512 LEFT SHOULDER PAIN, UNSPECIFIED CHRONICITY: Primary | ICD-10-CM

## 2019-01-02 PROCEDURE — 3017F COLORECTAL CA SCREEN DOC REV: CPT | Performed by: ORTHOPAEDIC SURGERY

## 2019-01-02 PROCEDURE — 1036F TOBACCO NON-USER: CPT | Performed by: ORTHOPAEDIC SURGERY

## 2019-01-02 PROCEDURE — G8427 DOCREV CUR MEDS BY ELIG CLIN: HCPCS | Performed by: ORTHOPAEDIC SURGERY

## 2019-01-02 PROCEDURE — 99213 OFFICE O/P EST LOW 20 MIN: CPT | Performed by: ORTHOPAEDIC SURGERY

## 2019-01-02 PROCEDURE — G8482 FLU IMMUNIZE ORDER/ADMIN: HCPCS | Performed by: ORTHOPAEDIC SURGERY

## 2019-01-02 PROCEDURE — G8419 CALC BMI OUT NRM PARAM NOF/U: HCPCS | Performed by: ORTHOPAEDIC SURGERY

## 2019-01-09 ENCOUNTER — OFFICE VISIT (OUTPATIENT)
Dept: PAIN MANAGEMENT | Age: 64
End: 2019-01-09
Payer: MEDICARE

## 2019-01-09 VITALS
HEIGHT: 61 IN | WEIGHT: 153 LBS | DIASTOLIC BLOOD PRESSURE: 80 MMHG | BODY MASS INDEX: 28.89 KG/M2 | HEART RATE: 102 BPM | SYSTOLIC BLOOD PRESSURE: 138 MMHG

## 2019-01-09 DIAGNOSIS — M96.1 POSTLAMINECTOMY SYNDROME OF LUMBAR REGION: ICD-10-CM

## 2019-01-09 DIAGNOSIS — G89.4 CHRONIC PAIN SYNDROME: ICD-10-CM

## 2019-01-09 DIAGNOSIS — M96.1 POSTLAMINECTOMY SYNDROME OF CERVICAL REGION: ICD-10-CM

## 2019-01-09 DIAGNOSIS — M13.0 POLYARTHROPATHY, MULTIPLE SITES: Primary | ICD-10-CM

## 2019-01-09 PROCEDURE — G8427 DOCREV CUR MEDS BY ELIG CLIN: HCPCS | Performed by: ANESTHESIOLOGY

## 2019-01-09 PROCEDURE — 1036F TOBACCO NON-USER: CPT | Performed by: ANESTHESIOLOGY

## 2019-01-09 PROCEDURE — G8482 FLU IMMUNIZE ORDER/ADMIN: HCPCS | Performed by: ANESTHESIOLOGY

## 2019-01-09 PROCEDURE — 99214 OFFICE O/P EST MOD 30 MIN: CPT | Performed by: ANESTHESIOLOGY

## 2019-01-09 PROCEDURE — 3017F COLORECTAL CA SCREEN DOC REV: CPT | Performed by: ANESTHESIOLOGY

## 2019-01-09 PROCEDURE — G8419 CALC BMI OUT NRM PARAM NOF/U: HCPCS | Performed by: ANESTHESIOLOGY

## 2019-01-09 RX ORDER — MELOXICAM 15 MG/1
15 TABLET ORAL DAILY
Qty: 90 TABLET | Refills: 1 | Status: SHIPPED | OUTPATIENT
Start: 2019-01-09 | End: 2019-12-05 | Stop reason: SDUPTHER

## 2019-01-09 RX ORDER — GABAPENTIN 400 MG/1
400 CAPSULE ORAL 3 TIMES DAILY
Qty: 270 CAPSULE | Refills: 1 | Status: SHIPPED | OUTPATIENT
Start: 2019-01-09 | End: 2019-06-11 | Stop reason: SDUPTHER

## 2019-01-09 ASSESSMENT — ENCOUNTER SYMPTOMS
BACK PAIN: 1
EYE PAIN: 0
NAUSEA: 0
WHEEZING: 0
VOMITING: 0
EYE REDNESS: 0
SHORTNESS OF BREATH: 0
CONSTIPATION: 0
ABDOMINAL PAIN: 0
COUGH: 0
EYE DISCHARGE: 0

## 2019-01-12 LAB
6-ACETYLMORPHINE: NOT DETECTED
7-AMINOCLONAZEPAM: NOT DETECTED
ALPHA-OH-ALPRAZOLAM: NOT DETECTED
ALPRAZOLAM: NOT DETECTED
AMPHETAMINE: NOT DETECTED
BARBITURATES: NOT DETECTED
BENZOYLECGONINE: NOT DETECTED
BUPRENORPHINE: NOT DETECTED
CARISOPRODOL: PRESENT
CLONAZEPAM: NOT DETECTED
CODEINE: NOT DETECTED
CREATININE URINE: 165.1 MG/DL (ref 20–400)
DIAZEPAM: NOT DETECTED
DRUGS EXPECTED: NORMAL
EER PAIN MGT DRUG PANEL, HIGH RES/EMIT U: NORMAL
ETHYL GLUCURONIDE: NOT DETECTED
FENTANYL: NOT DETECTED
HYDROCODONE: NOT DETECTED
HYDROMORPHONE: NOT DETECTED
LORAZEPAM: NOT DETECTED
MARIJUANA METABOLITE: NOT DETECTED
MDA: NOT DETECTED
MDEA: NOT DETECTED
MDMA URINE: NOT DETECTED
MEPERIDINE: NOT DETECTED
METHADONE: NOT DETECTED
METHAMPHETAMINE: NOT DETECTED
METHYLPHENIDATE: NOT DETECTED
MIDAZOLAM: NOT DETECTED
MORPHINE: NOT DETECTED
NORBUPRENORPHINE, FREE: NOT DETECTED
NORDIAZEPAM: NOT DETECTED
NORFENTANYL: NOT DETECTED
NORHYDROCODONE, URINE: NOT DETECTED
NOROXYCODONE: NOT DETECTED
NOROXYMORPHONE, URINE: NOT DETECTED
OXAZEPAM: NOT DETECTED
OXYCODONE: NOT DETECTED
OXYMORPHONE: NOT DETECTED
PAIN MANAGEMENT DRUG PANEL: NORMAL
PAIN MANAGEMENT DRUG PANEL: NORMAL
PCP: NOT DETECTED
PHENTERMINE: NOT DETECTED
PROPOXYPHENE: NOT DETECTED
TAPENTADOL, URINE: NOT DETECTED
TAPENTADOL-O-SULFATE, URINE: NOT DETECTED
TEMAZEPAM: NOT DETECTED
TRAMADOL: NOT DETECTED
ZOLPIDEM: NOT DETECTED

## 2019-01-16 ENCOUNTER — TELEPHONE (OUTPATIENT)
Dept: PAIN MANAGEMENT | Age: 64
End: 2019-01-16

## 2019-03-22 DIAGNOSIS — G89.29 CHRONIC HIP PAIN, UNSPECIFIED LATERALITY: ICD-10-CM

## 2019-03-22 DIAGNOSIS — M25.559 CHRONIC HIP PAIN, UNSPECIFIED LATERALITY: ICD-10-CM

## 2019-03-22 DIAGNOSIS — M96.1 POSTLAMINECTOMY SYNDROME OF CERVICAL REGION: ICD-10-CM

## 2019-03-22 DIAGNOSIS — M96.1 POSTLAMINECTOMY SYNDROME OF LUMBAR REGION: ICD-10-CM

## 2019-03-22 RX ORDER — TIZANIDINE 4 MG/1
4 TABLET ORAL 2 TIMES DAILY PRN
Qty: 60 TABLET | Refills: 1 | Status: SHIPPED | OUTPATIENT
Start: 2019-03-22 | End: 2019-12-05 | Stop reason: SDUPTHER

## 2019-04-03 RX ORDER — ROSUVASTATIN CALCIUM 5 MG/1
TABLET, COATED ORAL
Qty: 30 TABLET | Refills: 3 | Status: SHIPPED | OUTPATIENT
Start: 2019-04-03 | End: 2019-09-30

## 2019-05-07 ENCOUNTER — OFFICE VISIT (OUTPATIENT)
Dept: DERMATOLOGY | Age: 64
End: 2019-05-07
Payer: MEDICARE

## 2019-05-07 DIAGNOSIS — L57.8 DIFFUSE PHOTOAGING OF SKIN: ICD-10-CM

## 2019-05-07 DIAGNOSIS — D49.2 SOFT TISSUE NEOPLASM: ICD-10-CM

## 2019-05-07 DIAGNOSIS — D48.9 NEOPLASM OF UNCERTAIN BEHAVIOR: Primary | ICD-10-CM

## 2019-05-07 DIAGNOSIS — D22.9 MULTIPLE BENIGN MELANOCYTIC NEVI: ICD-10-CM

## 2019-05-07 DIAGNOSIS — L82.0 INFLAMED SEBORRHEIC KERATOSIS: ICD-10-CM

## 2019-05-07 DIAGNOSIS — Z85.828 HISTORY OF NONMELANOMA SKIN CANCER: ICD-10-CM

## 2019-05-07 DIAGNOSIS — Z78.9 NON-TOBACCO USER: ICD-10-CM

## 2019-05-07 PROCEDURE — 1123F ACP DISCUSS/DSCN MKR DOCD: CPT | Performed by: DERMATOLOGY

## 2019-05-07 PROCEDURE — 3017F COLORECTAL CA SCREEN DOC REV: CPT | Performed by: DERMATOLOGY

## 2019-05-07 PROCEDURE — G8427 DOCREV CUR MEDS BY ELIG CLIN: HCPCS | Performed by: DERMATOLOGY

## 2019-05-07 PROCEDURE — 4040F PNEUMOC VAC/ADMIN/RCVD: CPT | Performed by: DERMATOLOGY

## 2019-05-07 PROCEDURE — 99203 OFFICE O/P NEW LOW 30 MIN: CPT | Performed by: DERMATOLOGY

## 2019-05-07 PROCEDURE — 17110 DESTRUCTION B9 LES UP TO 14: CPT | Performed by: DERMATOLOGY

## 2019-05-07 PROCEDURE — G8399 PT W/DXA RESULTS DOCUMENT: HCPCS | Performed by: DERMATOLOGY

## 2019-05-07 PROCEDURE — 1036F TOBACCO NON-USER: CPT | Performed by: DERMATOLOGY

## 2019-05-07 PROCEDURE — G8419 CALC BMI OUT NRM PARAM NOF/U: HCPCS | Performed by: DERMATOLOGY

## 2019-05-07 PROCEDURE — 1090F PRES/ABSN URINE INCON ASSESS: CPT | Performed by: DERMATOLOGY

## 2019-05-07 PROCEDURE — 11102 TANGNTL BX SKIN SINGLE LES: CPT | Performed by: DERMATOLOGY

## 2019-05-07 RX ORDER — TIZANIDINE 4 MG/1
2 TABLET ORAL NIGHTLY
COMMUNITY
End: 2019-09-17 | Stop reason: SDUPTHER

## 2019-05-07 NOTE — PROGRESS NOTES
file    Intimate partner violence:     Fear of current or ex partner: Not on file     Emotionally abused: Not on file     Physically abused: Not on file     Forced sexual activity: Not on file   Other Topics Concern    Not on file   Social History Narrative    Not on file       Physical Examination     The following were examined and determined to be normal: Psych/Neuro, Scalp/hair, Head/face, Conjunctivae/eyelids, Gums/teeth/lips, Neck, Breast/axilla/chest, Abdomen, LUE, RLE, LLE and Nails/digits. Groin/buttocks. Areas covered by underwear garment(s) not examined. The following were examined and determined to be abnormal: Back and RUE. -General: NAD, well-nourished, well-developed. Total body skin exam performed, areas examined listed above:   1. Right proximal upper arm-0.5cm - 0.3 cm well-demarcated skin colored to pink with brown papule      2. Medial mid back- well-defined \"stuck-on\" verrucous tan-brown papule(s) w/ surrounding bright erythema  3. Scattered on the head,neck, trunk and extremities are multiple well-defined round and oval symmetric smoothly-bordered uniformly brown macules and papules. no change in size/shape/color of any lesions; no bleeding lesions. 4. Face, neck, chest, upper extremities-Scattered dyspigmentation with multiple lentigines and vascular change consistent with chronic sun exposure  5. Left superior back- 1.1cm soft skin colored nodule with mild tenderness on palpation  6. Left dorsal forearm- well healed scar at of prior SCC, no evidence of recurrence. Assessment and Plan     1. Neoplasm of uncertain behavior    2. Inflamed seborrheic keratosis    3. Multiple benign melanocytic nevi    4. Diffuse photoaging of skin    5. Soft tissue neoplasm    6. History of nonmelanoma skin cancer    7. Non-tobacco user        1. Neoplasm of uncertain behavior  DDx: dermatofibroma v. dermal nevus rule out atypia  -Discussed possible diagnosis. Patient agreeable to biopsy. Verbal consent obtained after risks (infection, bleeding, scar), benefits and alternatives explained. -Area(s) to be biopsied were marked with a surgical pen. Site(s) were cleansed with alcohol. Local anesthesia achieved with 1% lidocaine with epinephrine/sodium bicarbonate. Shave biopsy performed with a razor blade. Hemostasis was achieved with aluminum chloride and electrodessication. The wound(s) were dressed with petrolatum and covered with a bandage. Specimen(s) sent to pathology. Pt educated re: risk of bleeding, infection, scar, discoloration, and wound care instructions. - [id number] TANGENTIAL BIOPSY SKIN SINGLE LESION    2. Inflamed seborrheic keratosis  Patient educated that seborrheic keratoses have no malignant potential, due to localized irritation/discomfort, pt elects for cryotherapy treatment today:   -1 lesion(s) treated w/ liquid nitrogen x 1cycles - 3 seconds each. Edu re: risk of blister formation, discomfort, scar, hypopigmentation. Discussed wound care and instructions given to take home. - [place] DESTRUCTION BENIGN LESIONS UP TO 14    3. Multiple benign melanocytic nevi  Benign acquired melanocytic nevi  -Recommend monthly self skin exams   -Educated regarding the ABCDEs of melanoma detection   -Call for any new/changing moles or concerning lesions  -Reviewed sun protective behavior -- sun avoidance during the peak hours of the day, sun-protective clothing (including hat and sunglasses), sunscreen use (water resistant, broad spectrum, SPF at least 30, need for reapplication every 2 to 3 hours), avoidance of tanning beds   -Plan: Observation with annual skin checks (earlier if indicated) performed in office to monitor current nevi and to assess for new lesions.     4. Diffuse photoaging of skin  -Patient's skin showing evidence of chronic sun exposure leading to diffuse photodamage and photo-aging  -Educated patient that chronic sun exposure leads to increased risk for skin cancers and to have

## 2019-05-09 LAB — DERMATOLOGY PATHOLOGY REPORT: NORMAL

## 2019-05-13 ENCOUNTER — TELEPHONE (OUTPATIENT)
Dept: DERMATOLOGY | Age: 64
End: 2019-05-13

## 2019-05-13 NOTE — TELEPHONE ENCOUNTER
----- Message from Ele Evans DO sent at 5/13/2019  1:27 PM EDT -----  Benign mole, no further treatment needed.  Please notify pt of result(s)

## 2019-06-11 ENCOUNTER — TELEPHONE (OUTPATIENT)
Dept: DERMATOLOGY | Age: 64
End: 2019-06-11

## 2019-06-11 NOTE — TELEPHONE ENCOUNTER
Patient states she's currently taking Meloxicam (coated aspirin)-low dose    Should she discontinue this medication before her procedure on Thursday, 0613/19.     Please let her know [phone]

## 2019-06-11 NOTE — TELEPHONE ENCOUNTER
Please encourage patient to get permission to stop meloxicam from her prescribing physician. If the medication is medically necessary, then she should continue it.

## 2019-06-13 ENCOUNTER — PROCEDURE VISIT (OUTPATIENT)
Dept: DERMATOLOGY | Age: 64
End: 2019-06-13
Payer: MEDICARE

## 2019-06-13 VITALS
DIASTOLIC BLOOD PRESSURE: 77 MMHG | WEIGHT: 148.8 LBS | HEART RATE: 75 BPM | SYSTOLIC BLOOD PRESSURE: 127 MMHG | BODY MASS INDEX: 28.12 KG/M2

## 2019-06-13 DIAGNOSIS — D49.2 SOFT TISSUE NEOPLASM: Primary | ICD-10-CM

## 2019-06-13 PROCEDURE — 11404 EXC TR-EXT B9+MARG 3.1-4 CM: CPT | Performed by: DERMATOLOGY

## 2019-06-13 PROCEDURE — 12032 INTMD RPR S/A/T/EXT 2.6-7.5: CPT | Performed by: DERMATOLOGY

## 2019-06-13 RX ORDER — DOXYCYCLINE HYCLATE 100 MG
TABLET ORAL
Qty: 20 TABLET | Refills: 0 | Status: SHIPPED | OUTPATIENT
Start: 2019-06-13 | End: 2019-09-30 | Stop reason: ALTCHOICE

## 2019-06-13 NOTE — PROGRESS NOTES
SURGERY      cyst right    ENDOMETRIAL ABLATION      uterine    FOOT SURGERY      neuroma left foot    KNEE ARTHROSCOPY      right    SHOULDER SURGERY Right     rotator cuff    SKIN CANCER EXCISION  October 2015    left anterior upper chest    SKIN CANCER EXCISION  2-2016    mid chest area   [facility] SURGERY  January 27 2014    lumbar laminectomy, Dr. Mitzi Conklin  April 2001    cervical     TUMOR REMOVAL      left rib       Allergies   Allergen Reactions    Lipitor      Muscle aches    Tramadol      Drug interaction w/mobic/fluoxetine    Adhesive Tape Rash     Outpatient Medications Marked as Taking for the 6/13/19 encounter (Procedure visit) with Guilherme Barba, DO   Medication Sig Dispense Refill    doxycycline hyclate (VIBRA-TABS) 100 MG tablet Take 1 tablet p.o. with food and a full glass of water for 10 days, do not lie flat for 1 hour afterwards. Wear sunscreen daily. 20 tablet 0    gabapentin (NEURONTIN) 400 MG capsule Take 1 capsule by mouth 3 times daily for 90 days.  270 capsule 0    tiZANidine (ZANAFLEX) 4 MG tablet Take 2 mg by mouth nightly      Mag Oxide-Vit D3-Turmeric (MAGNESIUM-VITAMIN D3-TURMERIC PO) Take by mouth      FLUoxetine (PROZAC) 10 MG tablet Take 1 tablet by mouth daily (Patient taking differently: Take 10 mg by mouth daily Indications: taking 1/2 of tablet daily ) 90 tablet 2    Glucosa-Chondr-Na Chondr--[phone] MG TABS Take by mouth Indications: 2 tablets daily       Multiple Vitamins-Minerals (THERAPEUTIC MULTIVITAMIN-MINERALS) tablet Take 1 tablet by mouth daily         Social History:   Social History     Socioeconomic History    Marital status:      Spouse name: Not on file    Number of children: Not on file    Years of education: Not on file    Highest education level: Not on file   Occupational History    Occupation: Disabled      Comment: 3/2016   Social Needs    Financial resource strain: Not on file    Food insecurity: Worry: Not on file     Inability: Not on file    Transportation needs:     Medical: Not on file     Non-medical: Not on file   Tobacco Use    Smoking status: Never Smoker    Smokeless tobacco: Never Used   Substance and Sexual Activity    Alcohol use: Yes     Alcohol/week: 0.0 oz     Comment: occasional glass wine    Drug use: No    Sexual activity: Yes     Partners: Male   Lifestyle    Physical activity:     Days per week: Not on file     Minutes per session: Not on file    Stress: Not on file   Relationships    Social connections:     Talks on phone: Not on file     Gets together: Not on file     Attends [employer] service: Not on file     Active member of club or organization: Not on file     Attends meetings of clubs or organizations: Not on file     Relationship status: Not on file    Intimate partner violence:     Fear of current or ex partner: Not on file     Emotionally abused: Not on file     Physically abused: Not on file     Forced sexual activity: Not on file   Other Topics Concern    Not on file   Social History Narrative    Not on file       Physical Examination     Well appearing. BP: 127/77  1. Left superior back-1.1cm soft skin colored nodule with mild tenderness on palpation          Assessment and Plan     1. Soft tissue neoplasm        1. Soft tissue neoplasm  DDx: Neurofibroma v. Lipoma (angiolipoma) v. Other    -Informed written consent obtained after risks (bleeding, infection, scar, discomfort) and benefits explained. -Area prepped/draped in sterile fashion. Local anesthesia achieved with 1% lidocaine w/ epinephrine/sodium bicarbonate. Elliptical excision to superficial subcutaneous fat performed. Specimen sent to pathology. Edges undermined and hemostasis achieved w/ pinpoint electrodessication. Layered closure with 3-0 deep vicryl sutures and 5-0 running nylon sutures.  Pressure dressing applied.   -Width of lesion w/ 2 mm margins - 1.5 cm; length of repair 3.5 cm.   -[facility] re: wound care, suture removal   - Pt left office in stable condition. Prophylactic antibiotic sent to pharmacy: Doxycycline 100 mg p.o. twice daily for 10 days.  -Edu re: GI upset, pill esophagitis, photosensitivity, yeast infection, rare pseudotumor cerebri risk (HA, visual changes), (no pregnancy)    2 weeks suture removal         Return for 14 days for MA visit.

## 2019-06-13 NOTE — PATIENT INSTRUCTIONS
Surgical Wound Care Instructions  Sutured wounds:   After your surgery, go home and take it easy. Please refrain from any vigorous physical activity or heavy lifting.  Leave the pressure bandage in place for 48 hours. If it starts to detach, reinforce the bandage with another piece of tape.  Please keep the bandage dry for 48 hours   After 48 hours, the wound can get wet. Clean the area daily using mild soapy water and a gauze pad or cotton tipped applicator, applying gentle pressure.  Apply a thin layer of Vaseline or petroleum jelly.  Cut a Telfa pad in the shape of the wound but slightly larger and secure with tape. Special sites:  Facial sites:   Keep the wound elevated for the first 2 nights.  Do not sleep on the side of the body where the wound is located.  Do not bend your head lower than your heart or engage in heavy lifting. Leg:   Keep the leg elevated when reclining, using a pillow to elevate it. Complications:   If bleeding develops when you go home, apply pressure for 15 minutes continuously with no peeking. A small amount of ice in a bag covered with a towel may be used for another 10 minutes if necessary. If the bleeding does not stop, please call us [phone].  Please call if you develop any fevers, chills, or pus drains from the wound.  A small amount of redness at the site of the surgery is normal at first, but if the redness begins to spread and/or pain worsens, you may have an infection and need to call the office.

## 2019-06-18 ENCOUNTER — TELEPHONE (OUTPATIENT)
Dept: DERMATOLOGY | Age: 64
End: 2019-06-18

## 2019-06-18 LAB — DERMATOLOGY PATHOLOGY REPORT: NORMAL

## 2019-06-18 NOTE — TELEPHONE ENCOUNTER
----- Message from Aria Bernardo DO sent at 6/18/2019  1:07 PM EDT -----  Benign, excised after surgery. no further treatment needed.  Please notify pt of result(s)

## 2019-06-27 ENCOUNTER — NURSE ONLY (OUTPATIENT)
Dept: DERMATOLOGY | Age: 64
End: 2019-06-27

## 2019-06-27 DIAGNOSIS — D36.17 NEUROFIBROMA OF BACK: Primary | ICD-10-CM

## 2019-06-27 PROCEDURE — 99024 POSTOP FOLLOW-UP VISIT: CPT | Performed by: DERMATOLOGY

## 2019-09-17 RX ORDER — TIZANIDINE 4 MG/1
4 TABLET ORAL NIGHTLY
Qty: 90 TABLET | Refills: 0 | Status: SHIPPED | OUTPATIENT
Start: 2019-09-17 | End: 2020-05-18 | Stop reason: SDUPTHER

## 2019-09-17 RX ORDER — GABAPENTIN 400 MG/1
400 CAPSULE ORAL 3 TIMES DAILY
Qty: 270 CAPSULE | Refills: 0 | Status: SHIPPED | OUTPATIENT
Start: 2019-09-17 | End: 2019-12-27

## 2019-09-17 NOTE — TELEPHONE ENCOUNTER
MEDICATION ISSUES     Reported Issue:  Medication Refill    Medication Information     - Refill medication requested: Neurontin     - Medication dosage and quantity: 400mg #270     - Reporting side effects, if any: No     - Other issues, if any: NA     Controlled Substances     - Last refill date (per OARRS): NA      - Follow-up Appointment date: None    Pharmacy Information      - PHARMACY updated with patient: Yes     - PHARMACY updated in Chart: Yes      NOTE for Controlled Substances     PLEASE READ TO PATIENTS:    1. For routine refills: ALL MEDICATION REFILLS NEED 2 WORKING DAYS (48-HOUR) ADVANCED NOTICE - Not filled on weekends or holidays.     - Patient informed about the above policy:  Yes    2. If switching or changing opioid pain medications -    PATIENTS NEED TO BRING OLD MEDICATION FOR PILL COUNT AND POSSIBLE DESTRUCTION - before new medication could be filled.      - Patient informed about the above policy:  Yes

## 2019-09-30 ENCOUNTER — OFFICE VISIT (OUTPATIENT)
Dept: FAMILY MEDICINE CLINIC | Age: 64
End: 2019-09-30
Payer: MEDICARE

## 2019-09-30 VITALS
RESPIRATION RATE: 16 BRPM | BODY MASS INDEX: 28.47 KG/M2 | HEART RATE: 66 BPM | SYSTOLIC BLOOD PRESSURE: 156 MMHG | WEIGHT: 150.8 LBS | DIASTOLIC BLOOD PRESSURE: 80 MMHG | OXYGEN SATURATION: 99 % | HEIGHT: 61 IN

## 2019-09-30 DIAGNOSIS — Z12.39 SCREENING FOR BREAST CANCER: ICD-10-CM

## 2019-09-30 DIAGNOSIS — I10 ESSENTIAL HYPERTENSION: ICD-10-CM

## 2019-09-30 DIAGNOSIS — Z00.00 MEDICARE ANNUAL WELLNESS VISIT, SUBSEQUENT: Primary | ICD-10-CM

## 2019-09-30 DIAGNOSIS — M48.062 SPINAL STENOSIS OF LUMBAR REGION WITH NEUROGENIC CLAUDICATION: ICD-10-CM

## 2019-09-30 DIAGNOSIS — R53.83 FATIGUE, UNSPECIFIED TYPE: ICD-10-CM

## 2019-09-30 DIAGNOSIS — E78.2 MIXED HYPERLIPIDEMIA: ICD-10-CM

## 2019-09-30 DIAGNOSIS — Z00.00 ROUTINE GENERAL MEDICAL EXAMINATION AT A HEALTH CARE FACILITY: ICD-10-CM

## 2019-09-30 DIAGNOSIS — Z12.11 SCREENING FOR COLON CANCER: ICD-10-CM

## 2019-09-30 LAB
ALBUMIN SERPL-MCNC: 4.4 G/DL (ref 3.4–5)
ALP BLD-CCNC: 72 U/L (ref 40–129)
ALT SERPL-CCNC: 14 U/L (ref 10–40)
ANION GAP SERPL CALCULATED.3IONS-SCNC: 12 MMOL/L (ref 3–16)
AST SERPL-CCNC: 21 U/L (ref 15–37)
BILIRUB SERPL-MCNC: <0.2 MG/DL (ref 0–1)
BILIRUBIN DIRECT: <0.2 MG/DL (ref 0–0.3)
BILIRUBIN, INDIRECT: NORMAL MG/DL (ref 0–1)
BUN BLDV-MCNC: 15 MG/DL (ref 7–20)
CALCIUM SERPL-MCNC: 9.6 MG/DL (ref 8.3–10.6)
CHLORIDE BLD-SCNC: 105 MMOL/L (ref 99–110)
CHOLESTEROL, TOTAL: 303 MG/DL (ref 0–199)
CO2: 27 MMOL/L (ref 21–32)
CREAT SERPL-MCNC: 0.6 MG/DL (ref 0.6–1.2)
GFR AFRICAN AMERICAN: >60
GFR NON-AFRICAN AMERICAN: >60
GLUCOSE BLD-MCNC: 89 MG/DL (ref 70–99)
HCT VFR BLD CALC: 42.2 % (ref 36–48)
HDLC SERPL-MCNC: 83 MG/DL (ref 40–60)
HEMOGLOBIN: 13.7 G/DL (ref 12–16)
LDL CHOLESTEROL CALCULATED: 192 MG/DL
MCH RBC QN AUTO: 29.8 PG (ref 26–34)
MCHC RBC AUTO-ENTMCNC: 32.3 G/DL (ref 31–36)
MCV RBC AUTO: 92.1 FL (ref 80–100)
PDW BLD-RTO: 13.5 % (ref 12.4–15.4)
PHOSPHORUS: 3.9 MG/DL (ref 2.5–4.9)
PLATELET # BLD: 274 K/UL (ref 135–450)
PMV BLD AUTO: 8.9 FL (ref 5–10.5)
POTASSIUM SERPL-SCNC: 4.8 MMOL/L (ref 3.5–5.1)
RBC # BLD: 4.59 M/UL (ref 4–5.2)
SODIUM BLD-SCNC: 144 MMOL/L (ref 136–145)
TOTAL PROTEIN: 6.6 G/DL (ref 6.4–8.2)
TRIGL SERPL-MCNC: 139 MG/DL (ref 0–150)
TSH SERPL DL<=0.05 MIU/L-ACNC: 2.4 UIU/ML (ref 0.27–4.2)
VLDLC SERPL CALC-MCNC: 28 MG/DL
WBC # BLD: 6 K/UL (ref 4–11)

## 2019-09-30 PROCEDURE — 90653 IIV ADJUVANT VACCINE IM: CPT | Performed by: INTERNAL MEDICINE

## 2019-09-30 PROCEDURE — G8427 DOCREV CUR MEDS BY ELIG CLIN: HCPCS | Performed by: INTERNAL MEDICINE

## 2019-09-30 PROCEDURE — G0439 PPPS, SUBSEQ VISIT: HCPCS | Performed by: INTERNAL MEDICINE

## 2019-09-30 PROCEDURE — 3017F COLORECTAL CA SCREEN DOC REV: CPT | Performed by: INTERNAL MEDICINE

## 2019-09-30 PROCEDURE — 99214 OFFICE O/P EST MOD 30 MIN: CPT | Performed by: INTERNAL MEDICINE

## 2019-09-30 PROCEDURE — 1036F TOBACCO NON-USER: CPT | Performed by: INTERNAL MEDICINE

## 2019-09-30 PROCEDURE — 36415 COLL VENOUS BLD VENIPUNCTURE: CPT | Performed by: INTERNAL MEDICINE

## 2019-09-30 PROCEDURE — 4040F PNEUMOC VAC/ADMIN/RCVD: CPT | Performed by: INTERNAL MEDICINE

## 2019-09-30 PROCEDURE — G0008 ADMIN INFLUENZA VIRUS VAC: HCPCS | Performed by: INTERNAL MEDICINE

## 2019-09-30 PROCEDURE — G8419 CALC BMI OUT NRM PARAM NOF/U: HCPCS | Performed by: INTERNAL MEDICINE

## 2019-09-30 PROCEDURE — G8399 PT W/DXA RESULTS DOCUMENT: HCPCS | Performed by: INTERNAL MEDICINE

## 2019-09-30 PROCEDURE — 1123F ACP DISCUSS/DSCN MKR DOCD: CPT | Performed by: INTERNAL MEDICINE

## 2019-09-30 PROCEDURE — 1090F PRES/ABSN URINE INCON ASSESS: CPT | Performed by: INTERNAL MEDICINE

## 2019-09-30 ASSESSMENT — LIFESTYLE VARIABLES
HOW MANY STANDARD DRINKS CONTAINING ALCOHOL DO YOU HAVE ON A TYPICAL DAY: 0
HOW OFTEN DO YOU HAVE SIX OR MORE DRINKS ON ONE OCCASION: 0
AUDIT-C TOTAL SCORE: 2
HOW OFTEN DO YOU HAVE A DRINK CONTAINING ALCOHOL: 2

## 2019-09-30 ASSESSMENT — PATIENT HEALTH QUESTIONNAIRE - PHQ9
SUM OF ALL RESPONSES TO PHQ QUESTIONS 1-9: 2
SUM OF ALL RESPONSES TO PHQ QUESTIONS 1-9: 2

## 2019-09-30 ASSESSMENT — ENCOUNTER SYMPTOMS
RESPIRATORY NEGATIVE: 1
EYES NEGATIVE: 1
BACK PAIN: 1
GASTROINTESTINAL NEGATIVE: 1
ALLERGIC/IMMUNOLOGIC NEGATIVE: 1

## 2019-09-30 NOTE — PROGRESS NOTES
 Medium Chain Triglycerides (MCT OIL PO) Take by mouth Indications: 25 mg at HS      Coenzyme Q10 (CO Q 10) 100 MG CAPS Take by mouth      Multiple Vitamins-Minerals (THERAPEUTIC MULTIVITAMIN-MINERALS) tablet Take 1 tablet by mouth daily      Krill Oil 1000 MG CAPS Take 1 capsule by mouth daily       No current facility-administered medications for this visit. Allergies   Allergen Reactions    Lipitor      Muscle aches    Tramadol      Drug interaction w/mobic/fluoxetine    Adhesive Tape Rash     Social History     Tobacco Use    Smoking status: Never Smoker    Smokeless tobacco: Never Used   Substance Use Topics    Alcohol use: Yes     Alcohol/week: 0.0 standard drinks     Comment: occasional glass wine     Family History   Problem Relation Age of Onset    Heart Disease Mother     Cancer Mother         cervix and parathyroid    Arthritis Mother     Heart Disease Father     Cancer Father         lung and brain mets    Cancer Sister         skin ca - SCC    Arthritis Sister     Cancer Sister         skin ca - NMSC    Cancer Sister         skin ca- NMSC    Arthritis Brother        Review of Systems   Constitutional: Positive for fatigue. HENT: Negative. Eyes: Negative. Respiratory: Negative. Cardiovascular: Negative. Gastrointestinal: Negative. Endocrine: Negative. Genitourinary: Negative. Musculoskeletal: Positive for arthralgias, back pain, gait problem and myalgias. Skin: Negative. Allergic/Immunologic: Negative. Neurological: Positive for weakness. Hematological: Negative. Psychiatric/Behavioral: Negative. Vitals:    09/30/19 0923 09/30/19 1103   BP: 110/66 (!) 156/80   Pulse: 66    Resp: 16    SpO2: 99%    Weight: 150 lb 12.8 oz (68.4 kg)    Height: 5' 1\" (1.549 m)      Objective:   Physical Exam   Constitutional: She is oriented to person, place, and time. Vital signs are normal. She appears well-developed and well-nourished.   Non-toxic all negative except as indicated below. These results, as well as other patient data from the [address] form, are documented in Flowsheets linked to this Encounter. Allergies   Allergen Reactions    Lipitor      Muscle aches    Tramadol      Drug interaction w/mobic/fluoxetine    Adhesive Tape Rash     Prior to Visit Medications    Medication Sig Taking? Authorizing Provider   gabapentin (NEURONTIN) 400 MG capsule Take 1 capsule by mouth 3 times daily for 90 days. Yes Murray Killian MD   tiZANidine (ZANAFLEX) 4 MG tablet Take 1 tablet by mouth nightly Yes Murray Killian MD   Mag Oxide-Vit D3-Turmeric (MAGNESIUM-VITAMIN D3-TURMERIC PO) Take by mouth Yes Historical Provider, MD   meloxicam (MOBIC) 15 MG tablet Take 1 tablet by mouth daily (with food) 3- month supply  Patient taking differently: Take 7.5 mg by mouth daily (with food) 3- month supply Yes Murray Killian MD   FLUoxetine (PROZAC) 10 MG tablet Take 1 tablet by mouth daily  Patient taking differently: Take 10 mg by mouth daily Indications: taking 1/2 of tablet daily  Yes aCta Raya MD   aspirin 81 MG tablet Take 81 mg by mouth daily Yes Historical Provider, MD René Husain [facility] [phone] MG TABS Take by mouth Indications: 2 tablets daily  Yes Historical Provider, MD   Medium Chain Triglycerides (MCT OIL PO) Take by mouth Indications: 25 mg at HS  Historical Provider, MD   Coenzyme Q10 (CO Q 10) 100 MG CAPS Take by mouth  Historical Provider, MD   Multiple Vitamins-Minerals (THERAPEUTIC MULTIVITAMIN-MINERALS) tablet Take 1 tablet by mouth daily  Historical Provider, MD Chavez Smoker Oil 1000 MG CAPS Take 1 capsule by mouth daily  Historical Provider, MD     Past Medical History:   Diagnosis Date    Cancer ([facility] [address]     basal cell on left upper chest    Hemangioma     T10    Hyperlipidemia     Hypertension     Osteoarthritis     Shingles     Severe 1985 and 1989 back and right hip, right leg.      Past Surgical

## 2019-09-30 NOTE — PATIENT INSTRUCTIONS
All above problems reviewed and the found to be unchanged except for the following :     Medicare Annual Wellness Visit, Subsequent. Tetanus shot Rx given. Flu shot today, due for gyn exam and Colonoscopy     Spinal Stenosis of Lumbar Region With Neurogenic Claudication. To PAin MD     Mixed Hyperlipidemia. Will do labs and discuss options. Hasn't tolerated Statins x 3     Htn (Hypertension). High here. Home BP checks daily for 2 weeks. Call Linsey Colin w/ results. Mammo: 11/2018. Pap: gyn appt soon  Colonoscopy: referral given  DEXA: 2018  Eye: 4/2018. (yearly)  Hearing: passed in office exam.  Immunnization: Flu shot and Rx for Tetanus  MMSE: na  Get Up and Go: na  Tob: nonsmoker/never  ETOH: 3 glasses /week  Caffeine: moderate  Cardiac Risk Assessment:   Living will:  yes  Medical power of : yes      Personalized Preventive Plan for Candance Rasp - 9/30/2019  Medicare offers a range of preventive health benefits. Some of the tests and screenings are paid in full while other may be subject to a deductible, co-insurance, and/or copay. Some of these benefits include a comprehensive review of your medical history including lifestyle, illnesses that may run in your family, and various assessments and screenings as appropriate. After reviewing your medical record and screening and assessments performed today your provider may have ordered immunizations, labs, imaging, and/or referrals for you. A list of these orders (if applicable) as well as your Preventive Care list are included within your After Visit Summary for your review. Other Preventive Recommendations:    · A preventive eye exam performed by an eye specialist is recommended every 1-2 years to screen for glaucoma; cataracts, macular degeneration, and other eye disorders. · A preventive dental visit is recommended every 6 months. · Try to get at least 150 minutes of exercise per week or 10,000 steps per day on a pedometer .   · Order or download the FREE \"Exercise & Physical Activity: Your Everyday Guide\" from [employer]. Call 3-122.678.5043 or search [employer] online. · You need 9982-0038 mg of calcium and 2763-2298 IU of vitamin D per day. It is possible to meet your calcium requirement with diet alone, but a vitamin D supplement is usually necessary to meet this goal.  · When exposed to the sun, use a sunscreen that protects against both UVA and UVB radiation with an SPF of 30 or greater. Reapply every 2 to 3 hours or after sweating, drying off with a towel, or swimming. · Always wear a seat belt when traveling in a car. Always wear a helmet when riding a bicycle or motorcycle.

## 2019-10-01 ENCOUNTER — TELEPHONE (OUTPATIENT)
Dept: FAMILY MEDICINE CLINIC | Age: 64
End: 2019-10-01

## 2019-10-01 DIAGNOSIS — E78.00 HYPERCHOLESTEREMIA: Primary | ICD-10-CM

## 2019-10-01 RX ORDER — ROSUVASTATIN CALCIUM 20 MG/1
20 TABLET, COATED ORAL NIGHTLY
Qty: 30 TABLET | Refills: 5 | Status: SHIPPED | OUTPATIENT
Start: 2019-10-01 | End: 2020-01-24

## 2019-10-07 ENCOUNTER — TELEPHONE (OUTPATIENT)
Dept: FAMILY MEDICINE CLINIC | Age: 64
End: 2019-10-07

## 2019-10-07 RX ORDER — LISINOPRIL 10 MG/1
10 TABLET ORAL DAILY
Qty: 30 TABLET | Refills: 5 | Status: SHIPPED | OUTPATIENT
Start: 2019-10-07 | End: 2019-10-30 | Stop reason: SDUPTHER

## 2019-10-30 PROBLEM — Z00.00 MEDICARE ANNUAL WELLNESS VISIT, SUBSEQUENT: Status: RESOLVED | Noted: 2018-09-27 | Resolved: 2019-10-30

## 2019-10-30 RX ORDER — LISINOPRIL 10 MG/1
TABLET ORAL
Qty: 90 TABLET | Refills: 1 | Status: SHIPPED | OUTPATIENT
Start: 2019-10-30 | End: 2019-11-22 | Stop reason: SDUPTHER

## 2019-11-13 ENCOUNTER — OFFICE VISIT (OUTPATIENT)
Dept: ORTHOPEDIC SURGERY | Age: 64
End: 2019-11-13
Payer: MEDICARE

## 2019-11-13 VITALS — WEIGHT: 150 LBS | HEIGHT: 61 IN | BODY MASS INDEX: 28.32 KG/M2

## 2019-11-13 DIAGNOSIS — M25.511 RIGHT SHOULDER PAIN, UNSPECIFIED CHRONICITY: Primary | ICD-10-CM

## 2019-11-13 PROCEDURE — 20610 DRAIN/INJ JOINT/BURSA W/O US: CPT | Performed by: ORTHOPAEDIC SURGERY

## 2019-11-13 PROCEDURE — 1090F PRES/ABSN URINE INCON ASSESS: CPT | Performed by: ORTHOPAEDIC SURGERY

## 2019-11-13 PROCEDURE — 1123F ACP DISCUSS/DSCN MKR DOCD: CPT | Performed by: ORTHOPAEDIC SURGERY

## 2019-11-13 PROCEDURE — G8428 CUR MEDS NOT DOCUMENT: HCPCS | Performed by: ORTHOPAEDIC SURGERY

## 2019-11-13 PROCEDURE — 99213 OFFICE O/P EST LOW 20 MIN: CPT | Performed by: ORTHOPAEDIC SURGERY

## 2019-11-13 PROCEDURE — G8482 FLU IMMUNIZE ORDER/ADMIN: HCPCS | Performed by: ORTHOPAEDIC SURGERY

## 2019-11-13 PROCEDURE — 3017F COLORECTAL CA SCREEN DOC REV: CPT | Performed by: ORTHOPAEDIC SURGERY

## 2019-11-13 PROCEDURE — G8417 CALC BMI ABV UP PARAM F/U: HCPCS | Performed by: ORTHOPAEDIC SURGERY

## 2019-11-13 PROCEDURE — 4040F PNEUMOC VAC/ADMIN/RCVD: CPT | Performed by: ORTHOPAEDIC SURGERY

## 2019-11-13 PROCEDURE — 1036F TOBACCO NON-USER: CPT | Performed by: ORTHOPAEDIC SURGERY

## 2019-11-13 PROCEDURE — G8399 PT W/DXA RESULTS DOCUMENT: HCPCS | Performed by: ORTHOPAEDIC SURGERY

## 2019-11-13 RX ORDER — METHYLPREDNISOLONE ACETATE 40 MG/ML
80 INJECTION, SUSPENSION INTRA-ARTICULAR; INTRALESIONAL; INTRAMUSCULAR; SOFT TISSUE ONCE
Status: COMPLETED | OUTPATIENT
Start: 2019-11-13 | End: 2019-11-13

## 2019-11-13 RX ADMIN — METHYLPREDNISOLONE ACETATE 80 MG: 40 INJECTION, SUSPENSION INTRA-ARTICULAR; INTRALESIONAL; INTRAMUSCULAR; SOFT TISSUE at 13:49

## 2019-11-19 ENCOUNTER — HOSPITAL ENCOUNTER (OUTPATIENT)
Dept: PHYSICAL THERAPY | Age: 64
Setting detail: THERAPIES SERIES
Discharge: HOME | End: 2019-11-19
Payer: MEDICARE

## 2019-11-19 PROCEDURE — 97110 THERAPEUTIC EXERCISES: CPT | Performed by: PHYSICAL THERAPIST

## 2019-11-19 PROCEDURE — 97161 PT EVAL LOW COMPLEX 20 MIN: CPT | Performed by: PHYSICAL THERAPIST

## 2019-11-21 ENCOUNTER — HOSPITAL ENCOUNTER (OUTPATIENT)
Dept: WOMENS IMAGING | Age: 64
Discharge: HOME | End: 2019-11-21
Payer: MEDICARE

## 2019-11-21 DIAGNOSIS — Z12.31 ENCOUNTER FOR SCREENING MAMMOGRAM FOR MALIGNANT NEOPLASM OF BREAST: ICD-10-CM

## 2019-11-21 PROCEDURE — 77063 BREAST TOMOSYNTHESIS BI: CPT

## 2019-11-22 ENCOUNTER — TELEPHONE (OUTPATIENT)
Dept: FAMILY MEDICINE CLINIC | Age: 64
End: 2019-11-22

## 2019-11-22 RX ORDER — LISINOPRIL 20 MG/1
20 TABLET ORAL DAILY
Qty: 90 TABLET | Refills: 1 | Status: SHIPPED | OUTPATIENT
Start: 2019-11-22 | End: 2020-04-24

## 2019-11-26 ENCOUNTER — APPOINTMENT (OUTPATIENT)
Dept: PHYSICAL THERAPY | Age: 64
End: 2019-11-26
Payer: MEDICARE

## 2019-11-26 ENCOUNTER — HOSPITAL ENCOUNTER (OUTPATIENT)
Age: 64
Discharge: HOME | End: 2019-11-26
Payer: MEDICARE

## 2019-11-26 DIAGNOSIS — E78.00 HYPERCHOLESTEREMIA: ICD-10-CM

## 2019-11-26 LAB
ALBUMIN SERPL-MCNC: 4.1 G/DL (ref 3.4–5)
ALP BLD-CCNC: 72 U/L (ref 40–129)
ALT SERPL-CCNC: 20 U/L (ref 10–40)
AST SERPL-CCNC: 23 U/L (ref 15–37)
BILIRUB SERPL-MCNC: 0.3 MG/DL (ref 0–1)
BILIRUBIN DIRECT: <0.2 MG/DL (ref 0–0.3)
BILIRUBIN, INDIRECT: NORMAL MG/DL (ref 0–1)
CHOLESTEROL, TOTAL: 206 MG/DL (ref 0–199)
HDLC SERPL-MCNC: 79 MG/DL (ref 40–60)
LDL CHOLESTEROL CALCULATED: 107 MG/DL
TOTAL PROTEIN: 7 G/DL (ref 6.4–8.2)
TRIGL SERPL-MCNC: 101 MG/DL (ref 0–150)
VLDLC SERPL CALC-MCNC: 20 MG/DL

## 2019-11-26 PROCEDURE — 36415 COLL VENOUS BLD VENIPUNCTURE: CPT

## 2019-11-26 PROCEDURE — 80061 LIPID PANEL: CPT

## 2019-11-26 PROCEDURE — 80076 HEPATIC FUNCTION PANEL: CPT

## 2019-12-05 DIAGNOSIS — M96.1 POSTLAMINECTOMY SYNDROME OF LUMBAR REGION: ICD-10-CM

## 2019-12-05 DIAGNOSIS — G89.29 CHRONIC HIP PAIN, UNSPECIFIED LATERALITY: ICD-10-CM

## 2019-12-05 DIAGNOSIS — M13.0 POLYARTHROPATHY, MULTIPLE SITES: ICD-10-CM

## 2019-12-05 DIAGNOSIS — M96.1 POSTLAMINECTOMY SYNDROME OF CERVICAL REGION: ICD-10-CM

## 2019-12-05 DIAGNOSIS — M25.559 CHRONIC HIP PAIN, UNSPECIFIED LATERALITY: ICD-10-CM

## 2019-12-05 RX ORDER — MELOXICAM 15 MG/1
15 TABLET ORAL DAILY
Qty: 90 TABLET | Refills: 0 | Status: SHIPPED | OUTPATIENT
Start: 2019-12-05 | End: 2020-03-17

## 2019-12-05 RX ORDER — TIZANIDINE 4 MG/1
4 TABLET ORAL 2 TIMES DAILY PRN
Qty: 60 TABLET | Refills: 1 | Status: SHIPPED | OUTPATIENT
Start: 2019-12-05 | End: 2019-12-30

## 2019-12-11 ENCOUNTER — OFFICE VISIT (OUTPATIENT)
Dept: ORTHOPEDIC SURGERY | Age: 64
End: 2019-12-11
Payer: MEDICARE

## 2019-12-11 VITALS — BODY MASS INDEX: 28.32 KG/M2 | WEIGHT: 150 LBS | HEIGHT: 61 IN

## 2019-12-11 DIAGNOSIS — M19.012 ARTHRITIS OF LEFT SHOULDER REGION: Primary | ICD-10-CM

## 2019-12-11 DIAGNOSIS — M25.512 LEFT SHOULDER PAIN, UNSPECIFIED CHRONICITY: ICD-10-CM

## 2019-12-11 PROCEDURE — 20610 DRAIN/INJ JOINT/BURSA W/O US: CPT | Performed by: ORTHOPAEDIC SURGERY

## 2019-12-11 PROCEDURE — G8417 CALC BMI ABV UP PARAM F/U: HCPCS | Performed by: ORTHOPAEDIC SURGERY

## 2019-12-11 PROCEDURE — 99213 OFFICE O/P EST LOW 20 MIN: CPT | Performed by: ORTHOPAEDIC SURGERY

## 2019-12-11 PROCEDURE — 4040F PNEUMOC VAC/ADMIN/RCVD: CPT | Performed by: ORTHOPAEDIC SURGERY

## 2019-12-11 PROCEDURE — 1036F TOBACCO NON-USER: CPT | Performed by: ORTHOPAEDIC SURGERY

## 2019-12-11 PROCEDURE — G8399 PT W/DXA RESULTS DOCUMENT: HCPCS | Performed by: ORTHOPAEDIC SURGERY

## 2019-12-11 PROCEDURE — G8482 FLU IMMUNIZE ORDER/ADMIN: HCPCS | Performed by: ORTHOPAEDIC SURGERY

## 2019-12-11 PROCEDURE — 3017F COLORECTAL CA SCREEN DOC REV: CPT | Performed by: ORTHOPAEDIC SURGERY

## 2019-12-11 PROCEDURE — 1090F PRES/ABSN URINE INCON ASSESS: CPT | Performed by: ORTHOPAEDIC SURGERY

## 2019-12-11 PROCEDURE — 1123F ACP DISCUSS/DSCN MKR DOCD: CPT | Performed by: ORTHOPAEDIC SURGERY

## 2019-12-11 PROCEDURE — G8427 DOCREV CUR MEDS BY ELIG CLIN: HCPCS | Performed by: ORTHOPAEDIC SURGERY

## 2019-12-11 RX ORDER — METHYLPREDNISOLONE ACETATE 40 MG/ML
80 INJECTION, SUSPENSION INTRA-ARTICULAR; INTRALESIONAL; INTRAMUSCULAR; SOFT TISSUE ONCE
Status: COMPLETED | OUTPATIENT
Start: 2019-12-11 | End: 2019-12-11

## 2019-12-11 RX ADMIN — METHYLPREDNISOLONE ACETATE 80 MG: 40 INJECTION, SUSPENSION INTRA-ARTICULAR; INTRALESIONAL; INTRAMUSCULAR; SOFT TISSUE at 11:17

## 2019-12-27 DIAGNOSIS — M25.559 CHRONIC HIP PAIN, UNSPECIFIED LATERALITY: ICD-10-CM

## 2019-12-27 DIAGNOSIS — M96.1 POSTLAMINECTOMY SYNDROME OF CERVICAL REGION: ICD-10-CM

## 2019-12-27 DIAGNOSIS — M96.1 POSTLAMINECTOMY SYNDROME OF LUMBAR REGION: ICD-10-CM

## 2019-12-27 DIAGNOSIS — G89.29 CHRONIC HIP PAIN, UNSPECIFIED LATERALITY: ICD-10-CM

## 2019-12-27 RX ORDER — GABAPENTIN 400 MG/1
400 CAPSULE ORAL 3 TIMES DAILY
Qty: 270 CAPSULE | Refills: 0 | Status: SHIPPED | OUTPATIENT
Start: 2019-12-27 | End: 2020-05-18 | Stop reason: SDUPTHER

## 2019-12-30 RX ORDER — TIZANIDINE 4 MG/1
4 TABLET ORAL 2 TIMES DAILY PRN
Qty: 60 TABLET | Refills: 1 | Status: SHIPPED | OUTPATIENT
Start: 2019-12-30 | End: 2020-03-02 | Stop reason: SDUPTHER

## 2020-01-24 RX ORDER — ROSUVASTATIN CALCIUM 20 MG/1
TABLET, COATED ORAL
Qty: 90 TABLET | Refills: 1 | Status: SHIPPED | OUTPATIENT
Start: 2020-01-24 | End: 2020-07-24

## 2020-02-05 ENCOUNTER — OFFICE VISIT (OUTPATIENT)
Dept: ORTHOPEDIC SURGERY | Age: 65
End: 2020-02-05
Payer: MEDICARE

## 2020-02-05 VITALS — BODY MASS INDEX: 28.3 KG/M2 | WEIGHT: 149.91 LBS | HEIGHT: 61 IN

## 2020-02-05 PROCEDURE — G8417 CALC BMI ABV UP PARAM F/U: HCPCS | Performed by: ORTHOPAEDIC SURGERY

## 2020-02-05 PROCEDURE — G8427 DOCREV CUR MEDS BY ELIG CLIN: HCPCS | Performed by: ORTHOPAEDIC SURGERY

## 2020-02-05 PROCEDURE — L3670 SO ACRO/CLAV CAN WEB PRE OTS: HCPCS | Performed by: ORTHOPAEDIC SURGERY

## 2020-02-05 PROCEDURE — 1090F PRES/ABSN URINE INCON ASSESS: CPT | Performed by: ORTHOPAEDIC SURGERY

## 2020-02-05 PROCEDURE — 99213 OFFICE O/P EST LOW 20 MIN: CPT | Performed by: ORTHOPAEDIC SURGERY

## 2020-02-05 PROCEDURE — G8482 FLU IMMUNIZE ORDER/ADMIN: HCPCS | Performed by: ORTHOPAEDIC SURGERY

## 2020-02-05 NOTE — LETTER
[address] [facility]   Surgery Precert & Billing Form:    DEMOGRAPHICS:                                                                                                       Patient Name:  Ruthie Moon  Patient :  1955   Patient SS#:      Patient Phone:  [phone] (home)  Alt. Patient Phone:    Patient Address:  [address]    PCP:  Diana Canales MD  Insurance: Payor: MEDICARE / Plan: MEDICARE PART A AND B / Product Type: *No Product type* /     DIAGNOSIS & PROCEDURE:                                                                                      Diagnosis:   1.  Arthritis of left shoulder region      Operation: left    SURGERY  INFORMATION  Date of Surgery:   3/19/2020  Location:  [facility]    Type:    Inpatient  23 hour hold:  No  Surgeon:              Alissa Diana MD      20     BILLING INFORMATION:                                                                                                Physician Procedure                                            CPT Codes        Left total shoulder arthroplasty 19194                      PA, or Fellow Procedure                                      CPT Codes                     Precert information:

## 2020-02-05 NOTE — PROGRESS NOTES
We discussed the loss of cartilage, subchondral bone changes  and the inflammatory nature of the disease. A full spectrum of conservative options were presented including heat, topicals, the judicious use of anti-inflammatories and Tylenol, soft tissue treatments, gentle physical therapy, and intermittent chronic steroid injections. Finally, we discussed indications for surgery which would be the salvage of failure of conservative treatment. This was included but not limited to arthroscopic intervention for mild to moderate disease with the available literature quoted. Also discussed was shoulder arthroplasty and its inherent risks and benefits for advanced disease. The available literature on the outcomes, complications and durability of both anatomic and reverse total shoulder replacement were discussed. All questions were answered. We appreciate the opportunity to care for this patient. The trust that is implicit in this referral does not go unnoticed. We will do our very best to provide high-quality care that goes above and beyond standards. Please feel free contact us with any questions or concerns about this patient. [address] [place] [address] Partner of [facility] and Sports Medicine Surgery     This dictation was performed with a verbal recognition program (DRAGON) and it was checked for errors. It is possible that there are still dictated errors within this office note. If so, please bring any errors to my attention for an addendum. All efforts were made to ensure that this office note is accurate.

## 2020-02-06 ENCOUNTER — TELEPHONE (OUTPATIENT)
Dept: PAIN MANAGEMENT | Age: 65
End: 2020-02-06

## 2020-02-06 NOTE — TELEPHONE ENCOUNTER
Pt states she is has surgery for her shoulders and is scheduled the 2 week of March. Pt is asking should she come in before or after the surgery.

## 2020-02-19 ENCOUNTER — HOSPITAL ENCOUNTER (OUTPATIENT)
Age: 65
Discharge: HOME | End: 2020-02-19
Payer: MEDICARE

## 2020-02-19 ENCOUNTER — HOSPITAL ENCOUNTER (OUTPATIENT)
Dept: GENERAL RADIOLOGY | Age: 65
Discharge: HOME | End: 2020-02-19
Payer: MEDICARE

## 2020-02-19 ENCOUNTER — HOSPITAL ENCOUNTER (OUTPATIENT)
Dept: CT IMAGING | Age: 65
Discharge: HOME | End: 2020-02-19
Payer: MEDICARE

## 2020-02-19 PROCEDURE — 71046 X-RAY EXAM CHEST 2 VIEWS: CPT

## 2020-02-19 PROCEDURE — 73200 CT UPPER EXTREMITY W/O DYE: CPT

## 2020-02-25 ENCOUNTER — HOSPITAL ENCOUNTER (OUTPATIENT)
Age: 65
Discharge: HOME | End: 2020-02-25
Payer: MEDICARE

## 2020-02-25 LAB
ABO/RH: NORMAL
ALBUMIN SERPL-MCNC: 4.3 G/DL (ref 3.4–5)
ANION GAP SERPL CALCULATED.3IONS-SCNC: 12 MMOL/L (ref 3–16)
ANTIBODY SCREEN: NORMAL
APTT: 33.8 SEC (ref 24.2–36.2)
BACTERIA: ABNORMAL /HPF
BASOPHILS ABSOLUTE: 0.1 K/UL (ref 0–0.2)
BASOPHILS RELATIVE PERCENT: 1.2 %
BILIRUBIN URINE: NEGATIVE
BLOOD, URINE: ABNORMAL
BUN BLDV-MCNC: 13 MG/DL (ref 7–20)
CALCIUM SERPL-MCNC: 10 MG/DL (ref 8.3–10.6)
CHLORIDE BLD-SCNC: 104 MMOL/L (ref 99–110)
CLARITY: CLEAR
CO2: 27 MMOL/L (ref 21–32)
COLOR: YELLOW
CREAT SERPL-MCNC: 0.6 MG/DL (ref 0.6–1.2)
EOSINOPHILS ABSOLUTE: 0.2 K/UL (ref 0–0.6)
EOSINOPHILS RELATIVE PERCENT: 3.5 %
EPITHELIAL CELLS, UA: ABNORMAL /HPF (ref 0–5)
GFR AFRICAN AMERICAN: >60
GFR NON-AFRICAN AMERICAN: >60
GLUCOSE BLD-MCNC: 84 MG/DL (ref 70–99)
GLUCOSE URINE: NEGATIVE MG/DL
HCT VFR BLD CALC: 39.4 % (ref 36–48)
HEMOGLOBIN: 13.2 G/DL (ref 12–16)
INR BLD: 0.92 (ref 0.86–1.14)
KETONES, URINE: NEGATIVE MG/DL
LEUKOCYTE ESTERASE, URINE: ABNORMAL
LYMPHOCYTES ABSOLUTE: 1.9 K/UL (ref 1–5.1)
LYMPHOCYTES RELATIVE PERCENT: 27.5 %
MCH RBC QN AUTO: 30.3 PG (ref 26–34)
MCHC RBC AUTO-ENTMCNC: 33.4 G/DL (ref 31–36)
MCV RBC AUTO: 90.7 FL (ref 80–100)
MICROSCOPIC EXAMINATION: YES
MONOCYTES ABSOLUTE: 0.4 K/UL (ref 0–1.3)
MONOCYTES RELATIVE PERCENT: 5.1 %
NEUTROPHILS ABSOLUTE: 4.3 K/UL (ref 1.7–7.7)
NEUTROPHILS RELATIVE PERCENT: 62.7 %
NITRITE, URINE: NEGATIVE
PDW BLD-RTO: 13.2 % (ref 12.4–15.4)
PH UA: 7 (ref 5–8)
PLATELET # BLD: 278 K/UL (ref 135–450)
PMV BLD AUTO: 8.7 FL (ref 5–10.5)
POTASSIUM SERPL-SCNC: 4.5 MMOL/L (ref 3.5–5.1)
PROTEIN UA: NEGATIVE MG/DL
PROTHROMBIN TIME: 10.7 SEC (ref 10–13.2)
RBC # BLD: 4.34 M/UL (ref 4–5.2)
RBC UA: ABNORMAL /HPF (ref 0–4)
SODIUM BLD-SCNC: 143 MMOL/L (ref 136–145)
SPECIFIC GRAVITY UA: 1.01 (ref 1–1.03)
TRANSFERRIN: 236 MG/DL (ref 200–360)
URINE TYPE: ABNORMAL
UROBILINOGEN, URINE: 0.2 E.U./DL
WBC # BLD: 6.9 K/UL (ref 4–11)
WBC UA: ABNORMAL /HPF (ref 0–5)

## 2020-02-25 PROCEDURE — 86900 BLOOD TYPING SEROLOGIC ABO: CPT

## 2020-02-25 PROCEDURE — 85730 THROMBOPLASTIN TIME PARTIAL: CPT

## 2020-02-25 PROCEDURE — 93005 ELECTROCARDIOGRAM TRACING: CPT | Performed by: ORTHOPAEDIC SURGERY

## 2020-02-25 PROCEDURE — 83036 HEMOGLOBIN GLYCOSYLATED A1C: CPT

## 2020-02-25 PROCEDURE — 82040 ASSAY OF SERUM ALBUMIN: CPT

## 2020-02-25 PROCEDURE — 84466 ASSAY OF TRANSFERRIN: CPT

## 2020-02-25 PROCEDURE — 36415 COLL VENOUS BLD VENIPUNCTURE: CPT

## 2020-02-25 PROCEDURE — 86901 BLOOD TYPING SEROLOGIC RH(D): CPT

## 2020-02-25 PROCEDURE — 85025 COMPLETE CBC W/AUTO DIFF WBC: CPT

## 2020-02-25 PROCEDURE — 80048 BASIC METABOLIC PNL TOTAL CA: CPT

## 2020-02-25 PROCEDURE — 86850 RBC ANTIBODY SCREEN: CPT

## 2020-02-25 PROCEDURE — 81001 URINALYSIS AUTO W/SCOPE: CPT

## 2020-02-25 PROCEDURE — 85610 PROTHROMBIN TIME: CPT

## 2020-02-25 PROCEDURE — 87086 URINE CULTURE/COLONY COUNT: CPT

## 2020-02-26 LAB
EKG ATRIAL RATE: 65 BPM
EKG DIAGNOSIS: NORMAL
EKG P AXIS: 19 DEGREES
EKG P-R INTERVAL: 138 MS
EKG Q-T INTERVAL: 436 MS
EKG QRS DURATION: 86 MS
EKG QTC CALCULATION (BAZETT): 453 MS
EKG R AXIS: -22 DEGREES
EKG T AXIS: 4 DEGREES
EKG VENTRICULAR RATE: 65 BPM
ESTIMATED AVERAGE GLUCOSE: 105.4 MG/DL
HBA1C MFR BLD: 5.3 %
URINE CULTURE, ROUTINE: NORMAL

## 2020-02-26 PROCEDURE — 93010 ELECTROCARDIOGRAM REPORT: CPT | Performed by: INTERNAL MEDICINE

## 2020-02-27 RX ORDER — ROSUVASTATIN CALCIUM 5 MG/1
TABLET, COATED ORAL
Qty: 90 TABLET | Refills: 1 | OUTPATIENT
Start: 2020-02-27

## 2020-02-28 ENCOUNTER — TELEPHONE (OUTPATIENT)
Dept: ORTHOPEDIC SURGERY | Age: 65
End: 2020-02-28

## 2020-02-28 NOTE — TELEPHONE ENCOUNTER
Auth: NPR  Date: 3/19/2020  Reference # None  Spoke with: None  Type of SX: Inpatient  Location: A  CPT 28625   SX area: LT shoulder

## 2020-03-02 ENCOUNTER — OFFICE VISIT (OUTPATIENT)
Dept: FAMILY MEDICINE CLINIC | Age: 65
End: 2020-03-02
Payer: MEDICARE

## 2020-03-02 ENCOUNTER — TELEPHONE (OUTPATIENT)
Dept: FAMILY MEDICINE CLINIC | Age: 65
End: 2020-03-02

## 2020-03-02 VITALS
SYSTOLIC BLOOD PRESSURE: 106 MMHG | TEMPERATURE: 98.4 F | WEIGHT: 149.4 LBS | OXYGEN SATURATION: 98 % | HEIGHT: 61 IN | HEART RATE: 92 BPM | DIASTOLIC BLOOD PRESSURE: 60 MMHG | BODY MASS INDEX: 28.21 KG/M2 | RESPIRATION RATE: 18 BRPM

## 2020-03-02 PROCEDURE — G8417 CALC BMI ABV UP PARAM F/U: HCPCS | Performed by: PHYSICIAN ASSISTANT

## 2020-03-02 PROCEDURE — G8482 FLU IMMUNIZE ORDER/ADMIN: HCPCS | Performed by: PHYSICIAN ASSISTANT

## 2020-03-02 PROCEDURE — 1090F PRES/ABSN URINE INCON ASSESS: CPT | Performed by: PHYSICIAN ASSISTANT

## 2020-03-02 PROCEDURE — G8427 DOCREV CUR MEDS BY ELIG CLIN: HCPCS | Performed by: PHYSICIAN ASSISTANT

## 2020-03-02 PROCEDURE — 99212 OFFICE O/P EST SF 10 MIN: CPT | Performed by: PHYSICIAN ASSISTANT

## 2020-03-02 RX ORDER — BENZONATATE 100 MG/1
100 CAPSULE ORAL 3 TIMES DAILY PRN
Qty: 20 CAPSULE | Refills: 0 | Status: SHIPPED | OUTPATIENT
Start: 2020-03-02 | End: 2020-03-09

## 2020-03-02 RX ORDER — AMOXICILLIN AND CLAVULANATE POTASSIUM 875; 125 MG/1; MG/1
1 TABLET, FILM COATED ORAL 2 TIMES DAILY
Qty: 14 TABLET | Refills: 0 | Status: SHIPPED | OUTPATIENT
Start: 2020-03-02 | End: 2020-03-09

## 2020-03-02 ASSESSMENT — PATIENT HEALTH QUESTIONNAIRE - PHQ9
SUM OF ALL RESPONSES TO PHQ QUESTIONS 1-9: 0
1. LITTLE INTEREST OR PLEASURE IN DOING THINGS: 0
SUM OF ALL RESPONSES TO PHQ QUESTIONS 1-9: 0
2. FEELING DOWN, DEPRESSED OR HOPELESS: 0
SUM OF ALL RESPONSES TO PHQ9 QUESTIONS 1 & 2: 0

## 2020-03-02 NOTE — TELEPHONE ENCOUNTER
Pt states that her disability placard expires on 3/20/20. She is in need of a letter stating that she is eligible for one, so that it can be renewed. She initially received it from a physician in [place]. She states that she only uses it when she has to walk long distances. She cannot walk super long distances because of her spine. Would Dr. Vikki Meléndez be willing to write this letter? Please advise.  [phone]

## 2020-03-02 NOTE — PROGRESS NOTES
S2, no S3 or S4, and no murmur noted  Lungs:  Clear to auscultation, no crackles or wheezing. Constant clear and productive cough noticed. Equal chest percussion. Abdomen:  No scars, normal bowel sounds, soft, non-distended, non-tender, no masses palpated, no hepatosplenomegally  Extremities:  No clubbing, cyanosis, or edema. Moves all joints. Neurologic:  Awake, alert, oriented to name, place and time. Cranial nerves II-XII are grossly intact. Motor and sensory equal and intact bilateral.       ADDITIONAL DATA:  LABWORK:   Reviewed. Urine with RBCs, but culture negative. EKG with NSR. ASSESSMENT and PLAN:    Pre-Operative Medical Clearance Examination for left shoulder arthroplasty. Aexl Drilling 1955 71 y.o. female is medically satisfactory for surgery. She will be treated for a bronchitis today and follow up in 10 days to assure clearance. Axel Drilling was advised to STOP all  NSAID medications including, but not limited to, aspirin, ibuprofen, and naprosyn 7-10 days prior to procedure. [address], [place] 03/02/20   10:16 AM     ( Total time spent with Axel Drilling was: >30mins with >50% of this time was spent counseling and coordinating the care of this patient.  For bronchitis as well as preop)

## 2020-03-04 ENCOUNTER — TELEPHONE (OUTPATIENT)
Dept: ORTHOPEDIC SURGERY | Age: 65
End: 2020-03-04

## 2020-03-09 ENCOUNTER — OFFICE VISIT (OUTPATIENT)
Dept: FAMILY MEDICINE CLINIC | Age: 65
End: 2020-03-09
Payer: MEDICARE

## 2020-03-09 VITALS
SYSTOLIC BLOOD PRESSURE: 130 MMHG | TEMPERATURE: 98.2 F | WEIGHT: 150.8 LBS | HEART RATE: 72 BPM | BODY MASS INDEX: 28.47 KG/M2 | HEIGHT: 61 IN | OXYGEN SATURATION: 98 % | RESPIRATION RATE: 16 BRPM | DIASTOLIC BLOOD PRESSURE: 74 MMHG

## 2020-03-09 PROCEDURE — 99213 OFFICE O/P EST LOW 20 MIN: CPT | Performed by: PHYSICIAN ASSISTANT

## 2020-03-09 PROCEDURE — G8417 CALC BMI ABV UP PARAM F/U: HCPCS | Performed by: PHYSICIAN ASSISTANT

## 2020-03-09 PROCEDURE — 1090F PRES/ABSN URINE INCON ASSESS: CPT | Performed by: PHYSICIAN ASSISTANT

## 2020-03-09 PROCEDURE — 1036F TOBACCO NON-USER: CPT | Performed by: PHYSICIAN ASSISTANT

## 2020-03-09 PROCEDURE — 3017F COLORECTAL CA SCREEN DOC REV: CPT | Performed by: PHYSICIAN ASSISTANT

## 2020-03-09 PROCEDURE — G8427 DOCREV CUR MEDS BY ELIG CLIN: HCPCS | Performed by: PHYSICIAN ASSISTANT

## 2020-03-09 PROCEDURE — 4040F PNEUMOC VAC/ADMIN/RCVD: CPT | Performed by: PHYSICIAN ASSISTANT

## 2020-03-09 PROCEDURE — G8399 PT W/DXA RESULTS DOCUMENT: HCPCS | Performed by: PHYSICIAN ASSISTANT

## 2020-03-09 PROCEDURE — G8482 FLU IMMUNIZE ORDER/ADMIN: HCPCS | Performed by: PHYSICIAN ASSISTANT

## 2020-03-09 PROCEDURE — 1123F ACP DISCUSS/DSCN MKR DOCD: CPT | Performed by: PHYSICIAN ASSISTANT

## 2020-03-09 RX ORDER — BENZONATATE 100 MG/1
100 CAPSULE ORAL 3 TIMES DAILY PRN
Qty: 20 CAPSULE | Refills: 0 | Status: SHIPPED | OUTPATIENT
Start: 2020-03-09 | End: 2020-03-29

## 2020-03-09 NOTE — PROGRESS NOTES
[address] COMPLAINT  Chief Complaint   Patient presents with    Pre-op Exam     Pt was here for pre op on 3/2/20 but had bronchitis. Pt is here to be cleared. Pt is having left shoulder surgery on 3/19/20 at [facility] by Dr. Gus Francois [phone], [phone]           HISTORY OF PRESENT  ILLNESS  Mohinder Wilson 71 y.o. Bronchitis has improved. Feeling much better. No fever. Cough is dry and hacking, but improved with tessalon Perles and cough syrup. No new complaints. ROS:  Remaining 14 ROS were reviewed and are unremarkable for other constitutional, EENT, cardiac, pulmonary, GI, , neurologic, musculoskeletal, or integumentary complaints. Past Medical, Surgical, Family, Social Histories : reviewed and updated    Medications: reviewed and updated    Allergies- reviewed and updated      PHYSICAL EXAM:     Vitals:    03/09/20 1004 03/09/20 1014   BP: 130/74    Site: Right Upper Arm    Position: Sitting    Pulse: 71 72   Resp: 16    Temp: 98.2 °F (36.8 °C)    TempSrc: Oral    SpO2: 97% 98%   Weight: 150 lb 12.8 oz (68.4 kg)    Height: 5' 0.98\" (1.549 m)    Body mass index is 28.51 kg/m². APPEARANCE: Pleasant, friendly, well-nourished. No acute distress. HEENT: EOMI, PERRLA. Ears: Canals clear. TMs intact. Nares: Patent. Mouth: Oral mucosa is moist.  No rashes. Throat is clear without erythema edema or exudates. NECK: No lymphadenopathy  HEART: Rate and rhythm no murmurs rubs or gallops. LUNGS: Clear to auscultation no wheezes rales or rhonchi.no cough. Equal chest percussion  ABDOMEN:   Soft. MUSCULOSKELETAL:   Moves all extremities. No clubbing cyanosis or edema. NEUROLOGIC: Grossly nonfocal.  SKIN: Warm and dry, capillary refill <2 seconds. No rashes, petechiae, skin turgor        ASSESSMENT/PLAN:    1. Acute bronchitis, unspecified organism      -Improved. Stop mucinex.  Take Tessalon perles only as needed.    -Authur Ruy is cleared for shoulder surgery.            [address], [place] 03/09/20   10:16 AM

## 2020-03-16 ENCOUNTER — TELEPHONE (OUTPATIENT)
Dept: ORTHOPEDIC SURGERY | Age: 65
End: 2020-03-16

## 2020-03-17 RX ORDER — MELOXICAM 15 MG/1
15 TABLET ORAL DAILY
Qty: 90 TABLET | Refills: 0 | Status: SHIPPED | OUTPATIENT
Start: 2020-03-17 | End: 2020-05-18 | Stop reason: SDUPTHER

## 2020-04-06 ENCOUNTER — VIRTUAL VISIT (OUTPATIENT)
Dept: FAMILY MEDICINE CLINIC | Age: 65
End: 2020-04-06
Payer: MEDICARE

## 2020-04-06 PROBLEM — M25.512 CHRONIC LEFT SHOULDER PAIN: Status: ACTIVE | Noted: 2018-12-11

## 2020-04-06 PROCEDURE — 99213 OFFICE O/P EST LOW 20 MIN: CPT | Performed by: INTERNAL MEDICINE

## 2020-04-06 PROCEDURE — 4040F PNEUMOC VAC/ADMIN/RCVD: CPT | Performed by: INTERNAL MEDICINE

## 2020-04-06 PROCEDURE — G8399 PT W/DXA RESULTS DOCUMENT: HCPCS | Performed by: INTERNAL MEDICINE

## 2020-04-06 PROCEDURE — G8427 DOCREV CUR MEDS BY ELIG CLIN: HCPCS | Performed by: INTERNAL MEDICINE

## 2020-04-06 PROCEDURE — 1123F ACP DISCUSS/DSCN MKR DOCD: CPT | Performed by: INTERNAL MEDICINE

## 2020-04-06 PROCEDURE — 3017F COLORECTAL CA SCREEN DOC REV: CPT | Performed by: INTERNAL MEDICINE

## 2020-04-06 PROCEDURE — 1090F PRES/ABSN URINE INCON ASSESS: CPT | Performed by: INTERNAL MEDICINE

## 2020-04-06 NOTE — ASSESSMENT & PLAN NOTE
3 back surgeries. Sees Pain management. Now off most meds w/ core stretches.  Still on Gian pentin, Flexeril, and Meloxicam. Still w/ some pain w/ activity

## 2020-04-06 NOTE — PATIENT INSTRUCTIONS
All above problems reviewed and the found to be unchanged except for the following :     Chronic Left Shoulder Pain. Meds as rx. To surgery when able. Spinal Stenosis of Lumbar Region With Neurogenic Claudication. To f/u w/ Pain MD as needed. Call if new c/o. Mixed Hyperlipidemia. Doing well w/ Crestor. To continue meds and will do labs w/ AWV in 6 mo. Essential Hypertension. Continue present meds. Home BP checks. Call if>140/90. Improve diet. Avoid caffeine and salt. Call if new c/o w/ meds. AWV in 6 mo.

## 2020-04-06 NOTE — PROGRESS NOTES
2020    TELEHEALTH EVALUATION -- Audio/Visual (During YQWQX-88 public health emergency)    HPI:    Dari Vasquez (:  1955) has requested an audio/video evaluation for the following concern(s):    Mixed hyperlipidemia  Lost 40 lbs and was off meds but labs were high. Restarted med but changed to Crestor. Doing ok. Still active. Essential hypertension  No change in meds(Lisinopril ), no c/o with meds, no chest pain, SOB, palpatations, or syncope. Home bp was good. Chronic left shoulder pain  Was to have surgery last week but now cancelled for now. (COVID-19). Back on Tumeric for now. Still considerable discomfort. Spinal stenosis of lumbar region with neurogenic claudication  3 back surgeries. Sees Pain management. Now off most meds w/ core stretches. Still on Gian pentin, Flexeril, and Meloxicam. Still w/ some pain w/ activity    Review of Systems    Prior to Visit Medications    Medication Sig Taking? Authorizing Provider   meloxicam (MOBIC) 15 MG tablet TAKE 1 TABLET BY MOUTH DAILY (WITH FOOD) 3- MONTH SUPPLY Yes Chepe Moreno MD   CALCIUM/MAGNESIUM/ZINC FORMULA PO Take by mouth nightly Yes Historical Provider, MD   Turmeric 400 MG CAPS Take by mouth daily Yes Historical Provider, MD   mupirocin (BACTROBAN) 2 % ointment Apply small amount to each nostril twice daily starting 5 days prior to surgery. See surgery instructions for more information. Yes Karan Jennings MD   benzoyl peroxide (BENZOYL PEROXIDE) 5 % external liquid Wash neck, shoulder and armpit. See surgery instructions for more information.  Yes Karan Jennings MD   rosuvastatin (CRESTOR) 20 MG tablet TAKE 1 TABLET BY MOUTH EVERY DAY AT NIGHT  Patient taking differently: Take 20 mg by mouth daily  Yes Colin Valadez MD   lisinopril (PRINIVIL;ZESTRIL) 20 MG tablet Take 1 tablet by mouth daily Yes Colin Valadez MD   tiZANidine (ZANAFLEX) 4 MG tablet Take 1 tablet by mouth nightly Yes Chepe Moreno MD   Coenzyme Q10 (CO Q 10) [] Abnormal-       Neurological:        [x] No Facial Asymmetry (Cranial nerve 7 motor function) (limited exam to video visit)          [x] No gaze palsy        [] Abnormal-         Skin:        [x] No significant exanthematous lesions or discoloration noted on facial skin         [] Abnormal-            Psychiatric:       [x] Normal Affect [x] No Hallucinations        [] Abnormal-     Other pertinent observable physical exam findings-     ASSESSMENT/PLAN:  All above problems reviewed and the found to be unchanged except for the following :     Chronic Left Shoulder Pain. Meds as rx. To surgery when able. Spinal Stenosis of Lumbar Region With Neurogenic Claudication. To f/u w/ Pain MD as needed. Call if new c/o. Mixed Hyperlipidemia. Doing well w/ Crestor. To continue meds and will do labs w/ AWV in 6 mo. Essential Hypertension. Continue present meds. Home BP checks. Call if>140/90. Improve diet. Avoid caffeine and salt. Call if new c/o w/ meds. AWV in 6 mo. Alfreda Maurice is a 76 y.o. female being evaluated by a Virtual Visit (video visit) encounter to address concerns as mentioned above. A caregiver was present when appropriate. Due to this being a TeleHealth encounter (During UIATR-21 public health emergency), evaluation of the following organ systems was limited: Vitals/Constitutional/EENT/Resp/CV/GI//MS/Neuro/Skin/Heme-Lymph-Imm. Pursuant to the emergency declaration under the [facility] [address] and the [employer] Act, this Virtual Visit was conducted with patient's (and/or legal guardian's) consent, to reduce the patient's risk of exposure to COVID-19 and provide necessary medical care. The patient (and/or legal guardian) has also been advised to contact this office for worsening conditions or problems, and seek emergency medical treatment and/or call 911 if deemed necessary. Services were provided through a video synchronous discussion virtually to substitute for in-person clinic visit. Patient and provider were located at their individual homes. --Amee Hedrick MD on 4/6/2020 at 10:56 AM    An electronic signature was used to authenticate this note.

## 2020-04-06 NOTE — ASSESSMENT & PLAN NOTE
Lost 40 lbs and was off meds but labs were high. Restarted med but changed to Crestor. Doing ok. Still active.

## 2020-04-06 NOTE — ASSESSMENT & PLAN NOTE
Was to have surgery last week but now cancelled for now. (COVID-19). Back on Tumeric for now. Still considerable discomfort.

## 2020-04-24 RX ORDER — LISINOPRIL 20 MG/1
TABLET ORAL
Qty: 90 TABLET | Refills: 1 | Status: SHIPPED | OUTPATIENT
Start: 2020-04-24 | End: 2020-05-29

## 2020-05-04 ENCOUNTER — TELEPHONE (OUTPATIENT)
Dept: FAMILY MEDICINE CLINIC | Age: 65
End: 2020-05-04

## 2020-05-04 NOTE — TELEPHONE ENCOUNTER
Discussed w pt FEMALE NITESH RAMIREZ; First Name: Jeff _____      GA 34.3 weeks;     Age: 4 d;   PMA: _35_   BW: 2260g   MRN: [id number]    COURSE:  34 week, di-di twin, AGA,     s/p hypoglycemia (glucose gel x 1 and IVF), presumed sepsis    INTERVAL EVENTS:  no overnight events, feeds well     Weight (g): 2055 - 35 g (down 9% from birth weight)                                Intake (ml/kg/day): 84 + BF   Urine output (ml/kg/hr or frequency):  x 8                                Stools (frequency):   x 3   Other:     Growth:    HC (cm): 32 (08-18), 32 (08-15), 32 (08-15)        [08-15]  Length (cm):  43; [place] weight %  ____ ; ADWG (g/day)  _____ .  *******************************************************    Respiratory: Comfortable in RA.  CV: No current issues. Continue cardiorespiratory monitoring.  Heme: A+/O+/C neg.    At risk for hyperbilirubinemia due to prematurity. Monitor bilirubin levels. still increasing but below photo level   FEN: Feed EHM/DHM ad william  taking up to 25 ml per feed (88) q3h based on cues. Enable breastfeeding. Triple feeding pattern-attempt to BF no more than 2x/day.  Hypoglycemia, s/p glucose gel x 1 and s/p TPN. DS stable off IV fluids      ID: Presumed sepsis (ruled out).   s/p antibiotics, BCx results neg at 48 hrs    Neuro: Normal exam for GA.  ND eval   Ortho: breech at birth, needs hip US at 44 -46 weeks corrected age   Thermal: Monitor for mature thermoregulation in the open crib prior to discharge. Doing well in open crib. Earliest possible d/c home 8/21 given weight loss.     Social:      Labs/Imaging/Studies:  ariel wilcox FEMALE NITESH RAMIREZ; First Name: eJff _____      GA 34.3 weeks;     Age: 5d;   PMA: _35_   BW: 2260g   MRN: [id number]    COURSE:  34 week, di-di twin, AGA     s/p hypoglycemia (glucose gel x 1 and IVF), presumed sepsis    INTERVAL EVENTS:  no overnight events, feeds well     Weight (g): 2140 +85 g (down 5% from birth weight)                                Intake (ml/kg/day): 110 + BF x 2   Urine output (ml/kg/hr or frequency):  x 8                                Stools (frequency):   x 7  Other:     Growth:    HC (cm): 32 (08-18), 32 (08-15), 32 (08-15)        [08-15]  Length (cm):  43 (26%); [place] weight %  50 ; ADWG (g/day)  _____ .  *******************************************************    Respiratory: Comfortable in RA.  CV: No current issues. Continue cardiorespiratory monitoring.  Heme: A+/O+/C neg.    At risk for hyperbilirubinemia due to prematurity. Monitor bilirubin levels. Still increasing and nearing phototherapy level.  Will start phototherapy today.    FEN: Feed EHM/DHM ad william BF and supplementing with 20-35 ml per feed q3h based on cues. Enable breastfeeding. Triple feeding pattern-attempt to BF no more than 2x/day.  Hypoglycemia, s/p glucose gel x 1 and s/p TPN. DS stable off IV fluids      ID: Presumed sepsis (ruled out).   s/p antibiotics, BCx results neg at 48 hrs    Neuro: Normal exam for GA.  ND eval   Ortho: breech at birth, needs hip US at 44 -46 weeks corrected age   Thermal: Monitor for mature thermoregulation in the open crib prior to discharge. Doing well in open crib. Earliest possible d/c home 8/21 if bili levels ok.   Social:  Mom updated 8/20 (MB)     Labs/Imaging/Studies:

## 2020-05-11 ENCOUNTER — TELEPHONE (OUTPATIENT)
Dept: FAMILY MEDICINE CLINIC | Age: 65
End: 2020-05-11

## 2020-05-14 ENCOUNTER — TELEPHONE (OUTPATIENT)
Dept: FAMILY MEDICINE CLINIC | Age: 65
End: 2020-05-14

## 2020-05-14 NOTE — TELEPHONE ENCOUNTER
Pt is requesting a call from Ashley Tellez, regarding her BP medication.  Please call pt [phone]

## 2020-05-14 NOTE — TELEPHONE ENCOUNTER
Pt states resumed lisinopril and cough resumed past 2 mornings woke up at 3am feeling choked- states started when dose was increased

## 2020-05-17 RX ORDER — AMLODIPINE BESYLATE 5 MG/1
5 TABLET ORAL DAILY
Qty: 30 TABLET | Refills: 5 | Status: SHIPPED | OUTPATIENT
Start: 2020-05-17 | End: 2020-09-14

## 2020-05-18 ENCOUNTER — TELEMEDICINE (OUTPATIENT)
Dept: PAIN MANAGEMENT | Age: 65
End: 2020-05-18
Payer: MEDICARE

## 2020-05-18 PROBLEM — M25.511 PAIN OF BOTH SHOULDER JOINTS: Status: ACTIVE | Noted: 2020-05-18

## 2020-05-18 PROBLEM — G89.4 CHRONIC PAIN SYNDROME: Status: ACTIVE | Noted: 2020-05-18

## 2020-05-18 PROBLEM — M25.512 PAIN OF BOTH SHOULDER JOINTS: Status: ACTIVE | Noted: 2020-05-18

## 2020-05-18 PROBLEM — M13.0 POLYARTHROPATHY, MULTIPLE SITES: Status: ACTIVE | Noted: 2020-05-18

## 2020-05-18 PROCEDURE — 3017F COLORECTAL CA SCREEN DOC REV: CPT | Performed by: ANESTHESIOLOGY

## 2020-05-18 PROCEDURE — G8399 PT W/DXA RESULTS DOCUMENT: HCPCS | Performed by: ANESTHESIOLOGY

## 2020-05-18 PROCEDURE — 1123F ACP DISCUSS/DSCN MKR DOCD: CPT | Performed by: ANESTHESIOLOGY

## 2020-05-18 PROCEDURE — 99213 OFFICE O/P EST LOW 20 MIN: CPT | Performed by: ANESTHESIOLOGY

## 2020-05-18 PROCEDURE — G8417 CALC BMI ABV UP PARAM F/U: HCPCS | Performed by: ANESTHESIOLOGY

## 2020-05-18 PROCEDURE — 4040F PNEUMOC VAC/ADMIN/RCVD: CPT | Performed by: ANESTHESIOLOGY

## 2020-05-18 PROCEDURE — 1036F TOBACCO NON-USER: CPT | Performed by: ANESTHESIOLOGY

## 2020-05-18 PROCEDURE — G8427 DOCREV CUR MEDS BY ELIG CLIN: HCPCS | Performed by: ANESTHESIOLOGY

## 2020-05-18 PROCEDURE — 1090F PRES/ABSN URINE INCON ASSESS: CPT | Performed by: ANESTHESIOLOGY

## 2020-05-18 RX ORDER — TIZANIDINE 4 MG/1
4 TABLET ORAL 2 TIMES DAILY PRN
Qty: 120 TABLET | Refills: 0 | Status: SHIPPED | OUTPATIENT
Start: 2020-05-18 | End: 2020-08-17 | Stop reason: SDUPTHER

## 2020-05-18 RX ORDER — GABAPENTIN 400 MG/1
400 CAPSULE ORAL 3 TIMES DAILY
Qty: 270 CAPSULE | Refills: 0 | Status: SHIPPED | OUTPATIENT
Start: 2020-05-18 | End: 2020-08-17 | Stop reason: SDUPTHER

## 2020-05-18 RX ORDER — MELOXICAM 15 MG/1
15 TABLET ORAL DAILY
Qty: 90 TABLET | Refills: 0 | Status: SHIPPED | OUTPATIENT
Start: 2020-05-18 | End: 2020-08-17 | Stop reason: SDUPTHER

## 2020-05-22 ENCOUNTER — OFFICE VISIT (OUTPATIENT)
Dept: FAMILY MEDICINE CLINIC | Age: 65
End: 2020-05-22
Payer: MEDICARE

## 2020-05-22 VITALS
DIASTOLIC BLOOD PRESSURE: 70 MMHG | TEMPERATURE: 97.6 F | HEIGHT: 61 IN | BODY MASS INDEX: 28.17 KG/M2 | OXYGEN SATURATION: 98 % | HEART RATE: 96 BPM | SYSTOLIC BLOOD PRESSURE: 120 MMHG | WEIGHT: 149.2 LBS

## 2020-05-22 PROCEDURE — 1036F TOBACCO NON-USER: CPT | Performed by: PHYSICIAN ASSISTANT

## 2020-05-22 PROCEDURE — G8427 DOCREV CUR MEDS BY ELIG CLIN: HCPCS | Performed by: PHYSICIAN ASSISTANT

## 2020-05-22 PROCEDURE — G8417 CALC BMI ABV UP PARAM F/U: HCPCS | Performed by: PHYSICIAN ASSISTANT

## 2020-05-22 PROCEDURE — 99213 OFFICE O/P EST LOW 20 MIN: CPT | Performed by: PHYSICIAN ASSISTANT

## 2020-05-22 PROCEDURE — G8399 PT W/DXA RESULTS DOCUMENT: HCPCS | Performed by: PHYSICIAN ASSISTANT

## 2020-05-22 PROCEDURE — 1123F ACP DISCUSS/DSCN MKR DOCD: CPT | Performed by: PHYSICIAN ASSISTANT

## 2020-05-22 PROCEDURE — 3017F COLORECTAL CA SCREEN DOC REV: CPT | Performed by: PHYSICIAN ASSISTANT

## 2020-05-22 PROCEDURE — 1090F PRES/ABSN URINE INCON ASSESS: CPT | Performed by: PHYSICIAN ASSISTANT

## 2020-05-22 PROCEDURE — 4040F PNEUMOC VAC/ADMIN/RCVD: CPT | Performed by: PHYSICIAN ASSISTANT

## 2020-05-22 NOTE — PROGRESS NOTES
(PRINIVIL;ZESTRIL) 20 MG tablet TAKE 1 TABLET BY MOUTH EVERY DAY 90 tablet 1    CALCIUM/MAGNESIUM/ZINC FORMULA PO Take by mouth nightly      Turmeric 400 MG CAPS Take by mouth daily      mupirocin (BACTROBAN) 2 % ointment Apply small amount to each nostril twice daily starting 5 days prior to surgery. See surgery instructions for more information. 1 Tube 0    benzoyl peroxide (BENZOYL PEROXIDE) 5 % external liquid Wash neck, shoulder and armpit. See surgery instructions for more information. 1 Bottle 0    rosuvastatin (CRESTOR) 20 MG tablet TAKE 1 TABLET BY MOUTH EVERY DAY AT NIGHT (Patient taking differently: Take 20 mg by mouth daily ) 90 tablet 1    Coenzyme Q10 (CO Q 10) 100 MG CAPS Take by mouth daily       FLUoxetine (PROZAC) 10 MG tablet Take 1 tablet by mouth daily 90 tablet 2    aspirin 81 MG tablet Take 81 mg by mouth daily      Glucosa-Chondr-Na Chondr--[phone] MG TABS Take by mouth Indications: 2 tablets daily       Multiple Vitamins-Minerals (THERAPEUTIC MULTIVITAMIN-MINERALS) tablet Take 1 tablet by mouth daily       No current facility-administered medications for this visit. Allergies:  Lipitor; Tramadol; and Adhesive tape  History of allergic reaction to anesthesia:  No  Teeth: no caps or dentures    Social History:   Social History     Tobacco Use   Smoking Status Never Smoker   Smokeless Tobacco Never Used     The patient states she drinks 1-2 per week.   Family History:   Family History   Problem Relation Age of Onset    Heart Disease Mother     Cancer Mother         cervix and parathyroid    Arthritis Mother     Heart Disease Father     Cancer Father         lung and brain mets    Cancer Sister         skin ca - SCC    Arthritis Sister     Cancer Sister         skin ca - NMSC    Cancer Sister         skin ca- NMSC    Arthritis Brother        REVIEW OF SYSTEMS:    CONSTITUTIONAL:  negative  EYES:  negative  HEENT:  negative  RESPIRATORY:

## 2020-05-28 ENCOUNTER — OFFICE VISIT (OUTPATIENT)
Dept: PRIMARY CARE CLINIC | Age: 65
End: 2020-05-28

## 2020-05-29 ENCOUNTER — HOSPITAL ENCOUNTER (OUTPATIENT)
Dept: PREADMISSION TESTING | Age: 65
Discharge: HOME | End: 2020-06-02
Payer: MEDICARE

## 2020-05-29 LAB
ABO/RH: NORMAL
ALBUMIN SERPL-MCNC: 4.2 G/DL (ref 3.4–5)
ANION GAP SERPL CALCULATED.3IONS-SCNC: 11 MMOL/L (ref 3–16)
ANTIBODY SCREEN: NORMAL
APTT: 32.5 SEC (ref 24.2–36.2)
BASOPHILS ABSOLUTE: 0.1 K/UL (ref 0–0.2)
BASOPHILS RELATIVE PERCENT: 0.7 %
BILIRUBIN URINE: NEGATIVE
BLOOD, URINE: ABNORMAL
BUN BLDV-MCNC: 21 MG/DL (ref 7–20)
CALCIUM SERPL-MCNC: 9.4 MG/DL (ref 8.3–10.6)
CHLORIDE BLD-SCNC: 103 MMOL/L (ref 99–110)
CLARITY: CLEAR
CO2: 25 MMOL/L (ref 21–32)
COLOR: YELLOW
CREAT SERPL-MCNC: 0.5 MG/DL (ref 0.6–1.2)
EOSINOPHILS ABSOLUTE: 0.3 K/UL (ref 0–0.6)
EOSINOPHILS RELATIVE PERCENT: 3.3 %
GFR AFRICAN AMERICAN: >60
GFR NON-AFRICAN AMERICAN: >60
GLUCOSE BLD-MCNC: 94 MG/DL (ref 70–99)
GLUCOSE URINE: NEGATIVE MG/DL
HCT VFR BLD CALC: 40.3 % (ref 36–48)
HEMOGLOBIN: 13.3 G/DL (ref 12–16)
INR BLD: 0.93 (ref 0.86–1.14)
KETONES, URINE: NEGATIVE MG/DL
LEUKOCYTE ESTERASE, URINE: NEGATIVE
LYMPHOCYTES ABSOLUTE: 2.1 K/UL (ref 1–5.1)
LYMPHOCYTES RELATIVE PERCENT: 25.5 %
MCH RBC QN AUTO: 29.8 PG (ref 26–34)
MCHC RBC AUTO-ENTMCNC: 33.1 G/DL (ref 31–36)
MCV RBC AUTO: 90.2 FL (ref 80–100)
MICROSCOPIC EXAMINATION: YES
MONOCYTES ABSOLUTE: 0.5 K/UL (ref 0–1.3)
MONOCYTES RELATIVE PERCENT: 5.8 %
NEUTROPHILS ABSOLUTE: 5.3 K/UL (ref 1.7–7.7)
NEUTROPHILS RELATIVE PERCENT: 64.7 %
NITRITE, URINE: NEGATIVE
PDW BLD-RTO: 13.8 % (ref 12.4–15.4)
PH UA: 5.5 (ref 5–8)
PLATELET # BLD: 327 K/UL (ref 135–450)
PMV BLD AUTO: 8.5 FL (ref 5–10.5)
POTASSIUM SERPL-SCNC: 4.1 MMOL/L (ref 3.5–5.1)
PROTEIN UA: NEGATIVE MG/DL
PROTHROMBIN TIME: 10.8 SEC (ref 10–13.2)
RBC # BLD: 4.47 M/UL (ref 4–5.2)
RBC UA: ABNORMAL /HPF (ref 0–4)
SODIUM BLD-SCNC: 139 MMOL/L (ref 136–145)
SPECIFIC GRAVITY UA: <=1.005 (ref 1–1.03)
TRANSFERRIN: 246 MG/DL (ref 200–360)
URINE TYPE: ABNORMAL
UROBILINOGEN, URINE: 0.2 E.U./DL
WBC # BLD: 8.3 K/UL (ref 4–11)
WBC UA: ABNORMAL /HPF (ref 0–5)

## 2020-05-29 PROCEDURE — 85025 COMPLETE CBC W/AUTO DIFF WBC: CPT

## 2020-05-29 PROCEDURE — 83036 HEMOGLOBIN GLYCOSYLATED A1C: CPT

## 2020-05-29 PROCEDURE — 86900 BLOOD TYPING SEROLOGIC ABO: CPT

## 2020-05-29 PROCEDURE — 86850 RBC ANTIBODY SCREEN: CPT

## 2020-05-29 PROCEDURE — 84466 ASSAY OF TRANSFERRIN: CPT

## 2020-05-29 PROCEDURE — 85730 THROMBOPLASTIN TIME PARTIAL: CPT

## 2020-05-29 PROCEDURE — 87086 URINE CULTURE/COLONY COUNT: CPT

## 2020-05-29 PROCEDURE — 81001 URINALYSIS AUTO W/SCOPE: CPT

## 2020-05-29 PROCEDURE — 82040 ASSAY OF SERUM ALBUMIN: CPT

## 2020-05-29 PROCEDURE — 85610 PROTHROMBIN TIME: CPT

## 2020-05-29 PROCEDURE — 80048 BASIC METABOLIC PNL TOTAL CA: CPT

## 2020-05-29 PROCEDURE — 86901 BLOOD TYPING SEROLOGIC RH(D): CPT

## 2020-05-29 PROCEDURE — 36415 COLL VENOUS BLD VENIPUNCTURE: CPT

## 2020-05-30 LAB
ESTIMATED AVERAGE GLUCOSE: 119.8 MG/DL
HBA1C MFR BLD: 5.8 %
SARS-COV-2: NOT DETECTED
SOURCE: NORMAL
URINE CULTURE, ROUTINE: NORMAL

## 2020-06-03 ENCOUNTER — ANESTHESIA EVENT (OUTPATIENT)
Dept: OPERATING ROOM | Age: 65
DRG: 483 | End: 2020-06-03
Payer: MEDICARE

## 2020-06-03 RX ORDER — CELECOXIB 100 MG/1
100 CAPSULE ORAL 2 TIMES DAILY
Status: CANCELLED | OUTPATIENT
Start: 2020-06-03

## 2020-06-03 RX ORDER — GABAPENTIN 300 MG/1
300 CAPSULE ORAL 3 TIMES DAILY
Status: CANCELLED | OUTPATIENT
Start: 2020-06-03

## 2020-06-03 NOTE — ANESTHESIA PRE PROCEDURE
Allergen Reactions    Lipitor      Muscle aches    Lisinopril Other (See Comments)     cough    Lovenox [Enoxaparin Sodium] Other (See Comments)     Injection reaction- hematoma resulting in bowel blockage    Tramadol      Drug interaction w/mobic/fluoxetine    Adhesive Tape Rash       Problem List:    Patient Active Problem List   Diagnosis Code    DDD (degenerative disc disease), lumbosacral M51.37    Mixed hyperlipidemia E78.2    Essential hypertension I10    Lumbar radiculopathy, chronic M54.16    Spinal stenosis of lumbar region with neurogenic claudication M48.062    Osteoarthritis of lumbar spine M47.816    Primary osteoarthritis of both hips M16.0    Spondylolisthesis of lumbar region M43.16    Postlaminectomy syndrome of cervical region M96.1    Postlaminectomy syndrome of lumbar region M96.1    Chronic left shoulder pain M25.512, G89.29    Pain of both shoulder joints M25.511, M25.512    Polyarthropathy, multiple sites M13.0    Chronic pain syndrome G89.4       Past Medical History:        Diagnosis Date    Cancer ([facility] [address]     basal cell on left upper chest, SQUAMOUS L FOREARM    Hemangioma     T10    Hyperlipidemia     Hypertension     Osteoarthritis     PONV (postoperative nausea and vomiting)     Prolonged emergence from general anesthesia     POSSIBLY    Shingles     Severe 1985 and 1989 back and right hip, right leg.        Past Surgical History:        Procedure Laterality Date    BACK SURGERY      BREAST SURGERY Right     cyst right    ENDOMETRIAL ABLATION      uterine    FOOT SURGERY      neuroma left foot    JOINT REPLACEMENT Bilateral 2017    RIGHT MARCH/ LEFT JULY; hips    KNEE ARTHROSCOPY      right    SHOULDER SURGERY Right     rotator cuff, BICEP    SKIN CANCER EXCISION  October 2015    left anterior upper chest    SKIN CANCER EXCISION  2-2016    mid chest area   [facility] SPINE SURGERY  01/27/2015    lumbar laminectomy, Dr. Ella Escalante  97/1220 cervical     TUMOR REMOVAL      left rib       Social History:    Social History     Tobacco Use    Smoking status: Never Smoker    Smokeless tobacco: Never Used   Substance Use Topics    Alcohol use: Yes     Alcohol/week: 0.0 standard drinks     Comment: occasional glass wine                                Counseling given: Not Answered      Vital Signs (Current):   Vitals:    05/29/20 1619   Weight: 149 lb (67.6 kg)   Height: 5' 1\" (1.549 m)                                              BP Readings from Last 3 Encounters:   05/22/20 120/70   03/09/20 130/74   03/02/20 106/60       NPO Status:                                                                                 BMI:   Wt Readings from Last 3 Encounters:   05/22/20 149 lb 3.2 oz (67.7 kg)   03/09/20 150 lb 12.8 oz (68.4 kg)   03/02/20 149 lb 6.4 oz (67.8 kg)     Body mass index is 28.15 kg/m². CBC:   Lab Results   Component Value Date    WBC 8.3 05/29/2020    RBC 4.47 05/29/2020    RBC 4.31 05/21/2012    HGB 13.3 05/29/2020    HCT 40.3 05/29/2020    MCV 90.2 05/29/2020    RDW 13.8 05/29/2020     05/29/2020     05/21/2012       CMP:   Lab Results   Component Value Date     05/29/2020    K 4.1 05/29/2020     05/29/2020    CO2 25 05/29/2020    BUN 21 05/29/2020    CREATININE 0.5 05/29/2020    GFRAA >60 05/29/2020    LABGLOM >60 05/29/2020    GLUCOSE 94 05/29/2020    GLUCOSE 99 05/21/2012    PROT 7.0 11/26/2019    CALCIUM 9.4 05/29/2020    BILITOT 0.3 11/26/2019    ALKPHOS 72 11/26/2019    AST 23 11/26/2019    ALT 20 11/26/2019       POC Tests: No results for input(s): POCGLU, POCNA, POCK, POCCL, POCBUN, POCHEMO, POCHCT in the last 72 hours.     Coags:   Lab Results   Component Value Date    PROTIME 10.8 05/29/2020    INR 0.93 05/29/2020    APTT 32.5 05/29/2020       HCG (If Applicable): No results found for: PREGTESTUR, PREGSERUM, HCG, HCGQUANT     ABGs: No results found for: PHART, PO2ART, BVX4LQD, EGX1OUT, BEART, E6EGFSZX History     Tobacco Use    Smoking status: Never Smoker    Smokeless tobacco: Never Used   Substance Use Topics    Alcohol use:  Yes     Alcohol/week: 0.0 standard drinks     Comment: occasional glass wine       LABS:    CBC  Lab Results   Component Value Date/Time    WBC 8.3 05/29/2020 03:05 PM    HGB 13.3 05/29/2020 03:05 PM    HCT 40.3 05/29/2020 03:05 PM     05/29/2020 03:05 PM     05/21/2012 07:20 AM     RENAL  Lab Results   Component Value Date/Time     05/29/2020 03:05 PM    K 4.1 05/29/2020 03:05 PM     05/29/2020 03:05 PM    CO2 25 05/29/2020 03:05 PM    BUN 21 (H) 05/29/2020 03:05 PM    CREATININE 0.5 (L) 05/29/2020 03:05 PM    GLUCOSE 94 05/29/2020 03:05 PM    GLUCOSE 99 05/21/2012 07:20 AM     COAGS  Lab Results   Component Value Date/Time    PROTIME 10.8 05/29/2020 03:05 PM    INR 0.93 05/29/2020 03:05 PM    APTT 32.5 05/29/2020 03:05 PM       Julio César Street MD   6/3/2020

## 2020-06-04 ENCOUNTER — ANESTHESIA (OUTPATIENT)
Dept: OPERATING ROOM | Age: 65
DRG: 483 | End: 2020-06-04
Payer: MEDICARE

## 2020-06-04 ENCOUNTER — HOSPITAL ENCOUNTER (INPATIENT)
Age: 65
LOS: 1 days | Discharge: HOME | DRG: 483 | End: 2020-06-04
Attending: ORTHOPAEDIC SURGERY | Admitting: ORTHOPAEDIC SURGERY
Payer: MEDICARE

## 2020-06-04 VITALS
OXYGEN SATURATION: 100 % | TEMPERATURE: 98.6 F | RESPIRATION RATE: 8 BRPM | DIASTOLIC BLOOD PRESSURE: 67 MMHG | SYSTOLIC BLOOD PRESSURE: 118 MMHG

## 2020-06-04 VITALS
OXYGEN SATURATION: 96 % | RESPIRATION RATE: 14 BRPM | HEIGHT: 61 IN | WEIGHT: 146.4 LBS | DIASTOLIC BLOOD PRESSURE: 59 MMHG | BODY MASS INDEX: 27.64 KG/M2 | TEMPERATURE: 98.3 F | HEART RATE: 90 BPM | SYSTOLIC BLOOD PRESSURE: 95 MMHG

## 2020-06-04 LAB
ABO/RH: NORMAL
ANTIBODY SCREEN: NORMAL

## 2020-06-04 PROCEDURE — 2580000003 HC RX 258: Performed by: ORTHOPAEDIC SURGERY

## 2020-06-04 PROCEDURE — C1713 ANCHOR/SCREW BN/BN,TIS/BN: HCPCS | Performed by: ORTHOPAEDIC SURGERY

## 2020-06-04 PROCEDURE — 7100000011 HC PHASE II RECOVERY - ADDTL 15 MIN: Performed by: ORTHOPAEDIC SURGERY

## 2020-06-04 PROCEDURE — 6360000002 HC RX W HCPCS: Performed by: NURSE ANESTHETIST, CERTIFIED REGISTERED

## 2020-06-04 PROCEDURE — 0LS40ZZ REPOSITION LEFT UPPER ARM TENDON, OPEN APPROACH: ICD-10-PCS | Performed by: ORTHOPAEDIC SURGERY

## 2020-06-04 PROCEDURE — 6360000002 HC RX W HCPCS: Performed by: ORTHOPAEDIC SURGERY

## 2020-06-04 PROCEDURE — 2580000003 HC RX 258: Performed by: ANESTHESIOLOGY

## 2020-06-04 PROCEDURE — 6370000000 HC RX 637 (ALT 250 FOR IP): Performed by: ANESTHESIOLOGY

## 2020-06-04 PROCEDURE — 7100000010 HC PHASE II RECOVERY - FIRST 15 MIN: Performed by: ORTHOPAEDIC SURGERY

## 2020-06-04 PROCEDURE — 7100000000 HC PACU RECOVERY - FIRST 15 MIN: Performed by: ORTHOPAEDIC SURGERY

## 2020-06-04 PROCEDURE — 3700000001 HC ADD 15 MINUTES (ANESTHESIA): Performed by: ORTHOPAEDIC SURGERY

## 2020-06-04 PROCEDURE — 86901 BLOOD TYPING SEROLOGIC RH(D): CPT

## 2020-06-04 PROCEDURE — 1200000000 HC SEMI PRIVATE

## 2020-06-04 PROCEDURE — 6360000002 HC RX W HCPCS: Performed by: ANESTHESIOLOGY

## 2020-06-04 PROCEDURE — 76942 ECHO GUIDE FOR BIOPSY: CPT | Performed by: ANESTHESIOLOGY

## 2020-06-04 PROCEDURE — 7100000001 HC PACU RECOVERY - ADDTL 15 MIN: Performed by: ORTHOPAEDIC SURGERY

## 2020-06-04 PROCEDURE — 2500000003 HC RX 250 WO HCPCS: Performed by: NURSE ANESTHETIST, CERTIFIED REGISTERED

## 2020-06-04 PROCEDURE — 0RUK0JZ SUPPLEMENT LEFT SHOULDER JOINT WITH SYNTHETIC SUBSTITUTE, OPEN APPROACH: ICD-10-PCS | Performed by: ORTHOPAEDIC SURGERY

## 2020-06-04 PROCEDURE — 3600000014 HC SURGERY LEVEL 4 ADDTL 15MIN: Performed by: ORTHOPAEDIC SURGERY

## 2020-06-04 PROCEDURE — 2580000003 HC RX 258: Performed by: NURSE ANESTHETIST, CERTIFIED REGISTERED

## 2020-06-04 PROCEDURE — 3600000004 HC SURGERY LEVEL 4 BASE: Performed by: ORTHOPAEDIC SURGERY

## 2020-06-04 PROCEDURE — 3700000000 HC ANESTHESIA ATTENDED CARE: Performed by: ORTHOPAEDIC SURGERY

## 2020-06-04 PROCEDURE — 2709999900 HC NON-CHARGEABLE SUPPLY: Performed by: ORTHOPAEDIC SURGERY

## 2020-06-04 PROCEDURE — C1776 JOINT DEVICE (IMPLANTABLE): HCPCS | Performed by: ORTHOPAEDIC SURGERY

## 2020-06-04 PROCEDURE — 86850 RBC ANTIBODY SCREEN: CPT

## 2020-06-04 PROCEDURE — 86900 BLOOD TYPING SEROLOGIC ABO: CPT

## 2020-06-04 PROCEDURE — 0RRJ00Z REPLACEMENT OF RIGHT SHOULDER JOINT WITH REVERSE BALL AND SOCKET SYNTHETIC SUBSTITUTE, OPEN APPROACH: ICD-10-PCS | Performed by: ORTHOPAEDIC SURGERY

## 2020-06-04 DEVICE — SCREW BONE L26MM DIA5MM TI ST FULL THRD PERIPH FOR GLEN: Type: IMPLANTABLE DEVICE | Status: FUNCTIONAL

## 2020-06-04 DEVICE — INSERT HUM DIA36MM THK+6MM B-12.5DEG SHLDR REVERSED: Type: IMPLANTABLE DEVICE | Status: FUNCTIONAL

## 2020-06-04 DEVICE — IMPLANTABLE DEVICE: Type: IMPLANTABLE DEVICE | Status: FUNCTIONAL

## 2020-06-04 DEVICE — SCREW BONE L30MM DIA6.5MM TI ST FULL THRD CTRL FOR GLEN: Type: IMPLANTABLE DEVICE | Status: FUNCTIONAL

## 2020-06-04 DEVICE — SCREW BONE L18MM DIA5MM TI ST FULL THRD PERIPH FOR GLEN: Type: IMPLANTABLE DEVICE | Status: FUNCTIONAL

## 2020-06-04 DEVICE — SPHERE GLEN DIA36MM +3MM LAT REVERSED PERFORM AEQUALIS: Type: IMPLANTABLE DEVICE | Status: FUNCTIONAL

## 2020-06-04 DEVICE — BASEPLATE GLEN DIA25MM 35DEG HALF WDG AUG REVERSED AEQUALIS: Type: IMPLANTABLE DEVICE | Status: FUNCTIONAL

## 2020-06-04 DEVICE — IMPLANTABLE DEVICE
Type: IMPLANTABLE DEVICE | Status: FUNCTIONAL
Brand: AEQUALIS™ ASCEND™ FLEX

## 2020-06-04 DEVICE — TRAY HUM THK+0MM 15MM OFFSET SHLDR LO REVERSED AEQUALIS: Type: IMPLANTABLE DEVICE | Status: FUNCTIONAL

## 2020-06-04 DEVICE — SCREW BONE L22MM DIA5MM TI ST FULL THRD PERIPH FOR GLEN: Type: IMPLANTABLE DEVICE | Status: FUNCTIONAL

## 2020-06-04 RX ORDER — EPHEDRINE SULFATE 50 MG/ML
INJECTION INTRAVENOUS PRN
Status: DISCONTINUED | OUTPATIENT
Start: 2020-06-04 | End: 2020-06-04 | Stop reason: SDUPTHER

## 2020-06-04 RX ORDER — APREPITANT 40 MG/1
40 CAPSULE ORAL ONCE
Status: COMPLETED | OUTPATIENT
Start: 2020-06-04 | End: 2020-06-04

## 2020-06-04 RX ORDER — PROMETHAZINE HYDROCHLORIDE 25 MG/ML
6.25 INJECTION, SOLUTION INTRAMUSCULAR; INTRAVENOUS
Status: DISCONTINUED | OUTPATIENT
Start: 2020-06-04 | End: 2020-06-04 | Stop reason: HOSPADM

## 2020-06-04 RX ORDER — PHENYLEPHRINE HCL IN 0.9% NACL 1 MG/10 ML
SYRINGE (ML) INTRAVENOUS PRN
Status: DISCONTINUED | OUTPATIENT
Start: 2020-06-04 | End: 2020-06-04 | Stop reason: SDUPTHER

## 2020-06-04 RX ORDER — ONDANSETRON 2 MG/ML
INJECTION INTRAMUSCULAR; INTRAVENOUS PRN
Status: DISCONTINUED | OUTPATIENT
Start: 2020-06-04 | End: 2020-06-04 | Stop reason: SDUPTHER

## 2020-06-04 RX ORDER — MIDAZOLAM HYDROCHLORIDE 1 MG/ML
INJECTION INTRAMUSCULAR; INTRAVENOUS PRN
Status: DISCONTINUED | OUTPATIENT
Start: 2020-06-04 | End: 2020-06-04 | Stop reason: SDUPTHER

## 2020-06-04 RX ORDER — MORPHINE SULFATE 2 MG/ML
2 INJECTION, SOLUTION INTRAMUSCULAR; INTRAVENOUS EVERY 5 MIN PRN
Status: DISCONTINUED | OUTPATIENT
Start: 2020-06-04 | End: 2020-06-04 | Stop reason: HOSPADM

## 2020-06-04 RX ORDER — SODIUM CHLORIDE, SODIUM LACTATE, POTASSIUM CHLORIDE, CALCIUM CHLORIDE 600; 310; 30; 20 MG/100ML; MG/100ML; MG/100ML; MG/100ML
INJECTION, SOLUTION INTRAVENOUS CONTINUOUS PRN
Status: DISCONTINUED | OUTPATIENT
Start: 2020-06-04 | End: 2020-06-04 | Stop reason: SDUPTHER

## 2020-06-04 RX ORDER — ROCURONIUM BROMIDE 10 MG/ML
INJECTION, SOLUTION INTRAVENOUS PRN
Status: DISCONTINUED | OUTPATIENT
Start: 2020-06-04 | End: 2020-06-04 | Stop reason: SDUPTHER

## 2020-06-04 RX ORDER — HYDRALAZINE HYDROCHLORIDE 20 MG/ML
5 INJECTION INTRAMUSCULAR; INTRAVENOUS EVERY 10 MIN PRN
Status: DISCONTINUED | OUTPATIENT
Start: 2020-06-04 | End: 2020-06-04 | Stop reason: HOSPADM

## 2020-06-04 RX ORDER — GABAPENTIN 300 MG/1
300 CAPSULE ORAL ONCE
Status: COMPLETED | OUTPATIENT
Start: 2020-06-04 | End: 2020-06-04

## 2020-06-04 RX ORDER — FENTANYL CITRATE 50 UG/ML
INJECTION, SOLUTION INTRAMUSCULAR; INTRAVENOUS PRN
Status: DISCONTINUED | OUTPATIENT
Start: 2020-06-04 | End: 2020-06-04 | Stop reason: SDUPTHER

## 2020-06-04 RX ORDER — ONDANSETRON 2 MG/ML
4 INJECTION INTRAMUSCULAR; INTRAVENOUS PRN
Status: DISCONTINUED | OUTPATIENT
Start: 2020-06-04 | End: 2020-06-04 | Stop reason: HOSPADM

## 2020-06-04 RX ORDER — PROPOFOL 10 MG/ML
INJECTION, EMULSION INTRAVENOUS PRN
Status: DISCONTINUED | OUTPATIENT
Start: 2020-06-04 | End: 2020-06-04 | Stop reason: SDUPTHER

## 2020-06-04 RX ORDER — DEXAMETHASONE SODIUM PHOSPHATE 10 MG/ML
INJECTION INTRAMUSCULAR; INTRAVENOUS PRN
Status: DISCONTINUED | OUTPATIENT
Start: 2020-06-04 | End: 2020-06-04 | Stop reason: SDUPTHER

## 2020-06-04 RX ORDER — DIPHENHYDRAMINE HYDROCHLORIDE 50 MG/ML
12.5 INJECTION INTRAMUSCULAR; INTRAVENOUS
Status: DISCONTINUED | OUTPATIENT
Start: 2020-06-04 | End: 2020-06-04 | Stop reason: HOSPADM

## 2020-06-04 RX ORDER — OXYCODONE HYDROCHLORIDE AND ACETAMINOPHEN 5; 325 MG/1; MG/1
1 TABLET ORAL PRN
Status: COMPLETED | OUTPATIENT
Start: 2020-06-04 | End: 2020-06-04

## 2020-06-04 RX ORDER — GLYCOPYRROLATE 1 MG/5 ML
SYRINGE (ML) INTRAVENOUS PRN
Status: DISCONTINUED | OUTPATIENT
Start: 2020-06-04 | End: 2020-06-04 | Stop reason: SDUPTHER

## 2020-06-04 RX ORDER — 0.9 % SODIUM CHLORIDE 0.9 %
INTRAVENOUS SOLUTION INTRAVENOUS CONTINUOUS PRN
Status: COMPLETED | OUTPATIENT
Start: 2020-06-04 | End: 2020-06-04

## 2020-06-04 RX ORDER — CELECOXIB 100 MG/1
200 CAPSULE ORAL ONCE
Status: COMPLETED | OUTPATIENT
Start: 2020-06-04 | End: 2020-06-04

## 2020-06-04 RX ORDER — DOXYCYCLINE HYCLATE 100 MG
100 TABLET ORAL 2 TIMES DAILY
Qty: 10 TABLET | Refills: 0 | Status: SHIPPED | OUTPATIENT
Start: 2020-06-04 | End: 2020-06-09

## 2020-06-04 RX ORDER — SODIUM CHLORIDE, SODIUM LACTATE, POTASSIUM CHLORIDE, CALCIUM CHLORIDE 600; 310; 30; 20 MG/100ML; MG/100ML; MG/100ML; MG/100ML
INJECTION, SOLUTION INTRAVENOUS CONTINUOUS
Status: DISCONTINUED | OUTPATIENT
Start: 2020-06-04 | End: 2020-06-04 | Stop reason: HOSPADM

## 2020-06-04 RX ORDER — ACETAMINOPHEN 500 MG
1000 TABLET ORAL ONCE
Status: DISCONTINUED | OUTPATIENT
Start: 2020-06-04 | End: 2020-06-04 | Stop reason: HOSPADM

## 2020-06-04 RX ORDER — OXYCODONE HYDROCHLORIDE AND ACETAMINOPHEN 5; 325 MG/1; MG/1
1 TABLET ORAL EVERY 6 HOURS PRN
Qty: 28 TABLET | Refills: 0 | Status: SHIPPED | OUTPATIENT
Start: 2020-06-04 | End: 2020-06-15 | Stop reason: SDUPTHER

## 2020-06-04 RX ORDER — ACETAMINOPHEN 10 MG/ML
1000 INJECTION, SOLUTION INTRAVENOUS
Status: COMPLETED | OUTPATIENT
Start: 2020-06-04 | End: 2020-06-04

## 2020-06-04 RX ORDER — MORPHINE SULFATE 2 MG/ML
1 INJECTION, SOLUTION INTRAMUSCULAR; INTRAVENOUS EVERY 5 MIN PRN
Status: DISCONTINUED | OUTPATIENT
Start: 2020-06-04 | End: 2020-06-04 | Stop reason: HOSPADM

## 2020-06-04 RX ORDER — LIDOCAINE HYDROCHLORIDE 20 MG/ML
INJECTION, SOLUTION INFILTRATION; PERINEURAL PRN
Status: DISCONTINUED | OUTPATIENT
Start: 2020-06-04 | End: 2020-06-04 | Stop reason: SDUPTHER

## 2020-06-04 RX ORDER — SCOLOPAMINE TRANSDERMAL SYSTEM 1 MG/1
1 PATCH, EXTENDED RELEASE TRANSDERMAL ONCE
Status: DISCONTINUED | OUTPATIENT
Start: 2020-06-04 | End: 2020-06-04 | Stop reason: HOSPADM

## 2020-06-04 RX ORDER — TOBRAMYCIN 1.2 G/30ML
INJECTION, POWDER, LYOPHILIZED, FOR SOLUTION INTRAVENOUS PRN
Status: DISCONTINUED | OUTPATIENT
Start: 2020-06-04 | End: 2020-06-04 | Stop reason: ALTCHOICE

## 2020-06-04 RX ORDER — LABETALOL HYDROCHLORIDE 5 MG/ML
5 INJECTION, SOLUTION INTRAVENOUS EVERY 10 MIN PRN
Status: DISCONTINUED | OUTPATIENT
Start: 2020-06-04 | End: 2020-06-04 | Stop reason: HOSPADM

## 2020-06-04 RX ORDER — OXYCODONE HYDROCHLORIDE AND ACETAMINOPHEN 5; 325 MG/1; MG/1
2 TABLET ORAL PRN
Status: COMPLETED | OUTPATIENT
Start: 2020-06-04 | End: 2020-06-04

## 2020-06-04 RX ADMIN — EPHEDRINE SULFATE 5 MG: 50 INJECTION INTRAVENOUS at 10:24

## 2020-06-04 RX ADMIN — APREPITANT 40 MG: 40 CAPSULE ORAL at 08:57

## 2020-06-04 RX ADMIN — FENTANYL CITRATE 50 MCG: 50 INJECTION INTRAMUSCULAR; INTRAVENOUS at 09:49

## 2020-06-04 RX ADMIN — Medication 0.2 MG: at 09:48

## 2020-06-04 RX ADMIN — SODIUM CHLORIDE, POTASSIUM CHLORIDE, SODIUM LACTATE AND CALCIUM CHLORIDE: 600; 310; 30; 20 INJECTION, SOLUTION INTRAVENOUS at 09:26

## 2020-06-04 RX ADMIN — SUGAMMADEX 200 MG: 100 INJECTION, SOLUTION INTRAVENOUS at 11:06

## 2020-06-04 RX ADMIN — FENTANYL CITRATE 50 MCG: 50 INJECTION INTRAMUSCULAR; INTRAVENOUS at 09:47

## 2020-06-04 RX ADMIN — MIDAZOLAM HYDROCHLORIDE 2 MG: 2 INJECTION, SOLUTION INTRAMUSCULAR; INTRAVENOUS at 09:47

## 2020-06-04 RX ADMIN — FENTANYL CITRATE 50 MCG: 50 INJECTION INTRAMUSCULAR; INTRAVENOUS at 10:17

## 2020-06-04 RX ADMIN — EPHEDRINE SULFATE 5 MG: 50 INJECTION INTRAVENOUS at 10:45

## 2020-06-04 RX ADMIN — Medication 100 MCG: at 10:13

## 2020-06-04 RX ADMIN — DEXAMETHASONE SODIUM PHOSPHATE 10 MG: 10 INJECTION INTRAMUSCULAR; INTRAVENOUS at 10:07

## 2020-06-04 RX ADMIN — Medication 100 MCG: at 10:06

## 2020-06-04 RX ADMIN — FENTANYL CITRATE 50 MCG: 50 INJECTION INTRAMUSCULAR; INTRAVENOUS at 09:54

## 2020-06-04 RX ADMIN — PROPOFOL 150 MG: 10 INJECTION, EMULSION INTRAVENOUS at 09:54

## 2020-06-04 RX ADMIN — SODIUM CHLORIDE, POTASSIUM CHLORIDE, SODIUM LACTATE AND CALCIUM CHLORIDE: 600; 310; 30; 20 INJECTION, SOLUTION INTRAVENOUS at 10:24

## 2020-06-04 RX ADMIN — ACETAMINOPHEN 1000 MG: 10 INJECTION, SOLUTION INTRAVENOUS at 09:25

## 2020-06-04 RX ADMIN — GABAPENTIN 300 MG: 300 CAPSULE ORAL at 08:56

## 2020-06-04 RX ADMIN — ONDANSETRON 4 MG: 2 INJECTION INTRAMUSCULAR; INTRAVENOUS at 10:07

## 2020-06-04 RX ADMIN — LIDOCAINE HYDROCHLORIDE 3 ML: 20 INJECTION, SOLUTION INFILTRATION; PERINEURAL at 09:54

## 2020-06-04 RX ADMIN — ROCURONIUM BROMIDE 50 MG: 10 SOLUTION INTRAVENOUS at 09:54

## 2020-06-04 RX ADMIN — OXYCODONE HYDROCHLORIDE AND ACETAMINOPHEN 2 TABLET: 5; 325 TABLET ORAL at 12:00

## 2020-06-04 RX ADMIN — SODIUM CHLORIDE, POTASSIUM CHLORIDE, SODIUM LACTATE AND CALCIUM CHLORIDE: 600; 310; 30; 20 INJECTION, SOLUTION INTRAVENOUS at 07:51

## 2020-06-04 RX ADMIN — CEFAZOLIN SODIUM 2 G: 10 INJECTION, POWDER, FOR SOLUTION INTRAVENOUS at 09:48

## 2020-06-04 RX ADMIN — Medication 100 MCG: at 10:07

## 2020-06-04 RX ADMIN — CELECOXIB 200 MG: 100 CAPSULE ORAL at 08:56

## 2020-06-04 RX ADMIN — Medication 100 MCG: at 10:35

## 2020-06-04 ASSESSMENT — PULMONARY FUNCTION TESTS
PIF_VALUE: 18
PIF_VALUE: 19
PIF_VALUE: 18
PIF_VALUE: 20
PIF_VALUE: 1
PIF_VALUE: 18
PIF_VALUE: 19
PIF_VALUE: 19
PIF_VALUE: 17
PIF_VALUE: 20
PIF_VALUE: 2
PIF_VALUE: 1
PIF_VALUE: 17
PIF_VALUE: 18
PIF_VALUE: 17
PIF_VALUE: 19
PIF_VALUE: 19
PIF_VALUE: 17
PIF_VALUE: 18
PIF_VALUE: 17
PIF_VALUE: 18
PIF_VALUE: 17
PIF_VALUE: 1
PIF_VALUE: 2
PIF_VALUE: 18
PIF_VALUE: 8
PIF_VALUE: 19
PIF_VALUE: 0
PIF_VALUE: 16
PIF_VALUE: 18
PIF_VALUE: 19
PIF_VALUE: 17
PIF_VALUE: 17
PIF_VALUE: 18
PIF_VALUE: 19
PIF_VALUE: 19
PIF_VALUE: 18
PIF_VALUE: 2
PIF_VALUE: 2
PIF_VALUE: 18
PIF_VALUE: 21
PIF_VALUE: 0
PIF_VALUE: 20
PIF_VALUE: 1
PIF_VALUE: 0
PIF_VALUE: 14
PIF_VALUE: 19
PIF_VALUE: 2
PIF_VALUE: 19
PIF_VALUE: 19
PIF_VALUE: 17
PIF_VALUE: 2
PIF_VALUE: 18
PIF_VALUE: 20
PIF_VALUE: 18
PIF_VALUE: 1
PIF_VALUE: 0
PIF_VALUE: 19
PIF_VALUE: 22
PIF_VALUE: 18
PIF_VALUE: 18
PIF_VALUE: 1
PIF_VALUE: 14
PIF_VALUE: 5
PIF_VALUE: 18
PIF_VALUE: 18
PIF_VALUE: 0
PIF_VALUE: 19
PIF_VALUE: 18
PIF_VALUE: 19
PIF_VALUE: 22
PIF_VALUE: 18
PIF_VALUE: 20
PIF_VALUE: 5
PIF_VALUE: 18
PIF_VALUE: 20
PIF_VALUE: 20
PIF_VALUE: 2
PIF_VALUE: 2
PIF_VALUE: 18
PIF_VALUE: 18
PIF_VALUE: 19
PIF_VALUE: 18
PIF_VALUE: 2
PIF_VALUE: 2
PIF_VALUE: 18
PIF_VALUE: 18
PIF_VALUE: 19
PIF_VALUE: 18

## 2020-06-04 ASSESSMENT — PAIN DESCRIPTION - DESCRIPTORS
DESCRIPTORS: ACHING;CONSTANT
DESCRIPTORS: ACHING

## 2020-06-04 ASSESSMENT — PAIN - FUNCTIONAL ASSESSMENT: PAIN_FUNCTIONAL_ASSESSMENT: 0-10

## 2020-06-04 ASSESSMENT — PAIN DESCRIPTION - ORIENTATION: ORIENTATION: LEFT

## 2020-06-04 ASSESSMENT — PAIN DESCRIPTION - PAIN TYPE: TYPE: SURGICAL PAIN

## 2020-06-04 ASSESSMENT — PAIN SCALES - GENERAL
PAINLEVEL_OUTOF10: 4
PAINLEVEL_OUTOF10: 5

## 2020-06-04 NOTE — ANESTHESIA PROCEDURE NOTES
Peripheral Block    Patient location during procedure: pre-op  Start time: 6/4/2020 9:21 AM  End time: 6/4/2020 9:22 AM  Staffing  Anesthesiologist: Cristel hCeng MD  Performed: anesthesiologist   Preanesthetic Checklist  Completed: patient identified, site marked, surgical consent, pre-op evaluation, timeout performed, IV checked, risks and benefits discussed, monitors and equipment checked, anesthesia consent given, oxygen available and patient being monitored  Peripheral Block  Patient position: sitting  Prep: ChloraPrep  Patient monitoring: cardiac monitor, continuous pulse ox, frequent blood pressure checks and IV access  Block type: Brachial plexus  Laterality: left  Injection technique: single-shot  Procedures: ultrasound guided  Local infiltration: lidocaine  Infiltration strength: 1 %  Dose: 3 mL  Interscalene  Provider prep: mask and sterile gloves  Local infiltration: lidocaine  Needle  Needle gauge: 21 G  Needle length: 10 cm  Needle localization: ultrasound guidance  Assessment  Injection assessment: negative aspiration for heme, no paresthesia on injection and local visualized surrounding nerve on ultrasound  Paresthesia pain: none  Slow fractionated injection: yes  Hemodynamics: stable  Additional Notes  Immediately prior to procedure a \"time out\" was called to verify the correct patient, allergies, laterality, procedure and equipment. Time out performed with  RN    Local Anesthetic: 0.5 %  Bupivacaine   Amount: 20 ml  in 5 ml increments after negative aspiration each time. Anterior scalene and middle scalene muscles, upper, middle and lower trunks of the brachial plexus are identified, the tip of the needle and the spread of the local anesthetic around the brachial plexus are visualized. The Brachial plexus appeared to be anatomically normal and there were no abnormal pathologically findings seen.      Versed 2mg  Reason for block: post-op pain management and at surgeon's request

## 2020-06-04 NOTE — OP NOTE
Operative Note      Patient: Symone Jones  YOB: 1955  MRN: [id number]    Date of Procedure: 6/4/2020    Pre-Op Diagnosis: LEFT SHOULDER ARTHROPATHY    Post-Op Diagnosis: B2 glenoid with posterior erosions, deficient supraspinatus tendon       Procedure(s):  LEFT REVERSE TOTAL SHOULDER ARTHROPLASTY WITH INTERSCALENE BLOCK FOR PAIN CONTROL   #1. Left reverse shoulder arthroplasty  #2. Posterior glenoid augmentation with a half wedge; modifier 22 justification based upon the numerous steps, pin preparation, reaming and meticulous attention required for this placement. #3.  Open biceps tenodesis             [facility]    Surgeon(s):  Robi Sigala MD    Assistant:   Surgical Assistant: Santy Moseley    Anesthesia: General    Estimated Blood Loss (mL): 684     Complications: None    Specimens:   * No specimens in log *    Implants:  Implant Name Type Inv.  Item Serial No.  Lot No. LRB No. Used Action   IMPL SHLDER WEDGE AUGMT BASEPLT 25MM Shoulder/Arm/Wrist/Hand IMPL SHLDER WEDGE AUGMT BASEPLT 25MM  [employer] [id number] Left 1 Implanted   SCREW PERFORM REV CENTRAL 6.5X30MM Screw/Plate/Nail/Sandip SCREW PERFORM REV CENTRAL 6.5X30MM  [employer]  Left 1 Implanted   SCREW PERFORM REV PERIPHERAL 5.0X18MM Screw/Plate/Nail/Sandip SCREW PERFORM REV PERIPHERAL 5.0X18MM  [employer]  Left 1 Implanted   SCREW PERF REVRSED PERIPHERAL 22MM Screw/Plate/Nail/Sandip SCREW PERF REVRSED PERIPHERAL 22MM  [employer]  Left 1 Implanted   SCREW PERFORM REV PERIPHERAL 26MM Screw/Plate/Nail/Sandip SCREW PERFORM REV PERIPHERAL 26MM  [employer]  Left 1 Implanted   IMPL SHOULDER GLENOSPHERE LAT 36MM +3MM Shoulder/Arm/Wrist/Hand IMPL SHOULDER GLENOSPHERE LAT 36MM +3MM  [employer] [id number] Left 1 Implanted   IMPL SHOULDER FLEX TRAY REV LOW OFFSET+0 Shoulder/Arm/Wrist/Hand IMPL SHOULDER FLEX TRAY REV LOW OFFSET+0  [employer] [id number] Left 1 Implanted   IMPL SHOULDER STEM FLEX ASCEND PTC Shoulder/Arm/Wrist/Hand A standard humeral cut was made at roughly 20-30° of retroversion and in line with the anatomic neck. Peripheral osteophytes were resected. We then moved directly to the glenoid. A circumferential labral ectomy was performed. We very carefully appreciated the anatomy and orientation of the glenoid vault. She had a 20 degree retroversion with B2 erosions. .  We then sequentially prepared the glenoid with placement of the central pin and reaming. Please note this a step was  and complex based upon the multiple pin placements and reamers to address the placement of a metallic half wedge posterior augmentation for stability and correction of scapular version. The baseplate and central screw were impacted with firm fist tightening. 3 peripheral locking screws were placed. The selected glenoid sphere was then impacted very carefully and the set screw engaged and checked. We subsequently moved to the humerus. We had good exposure and carefully protected soft tissues. Axial and rotational stability was achieved after reaming and broaching. With the trial stem we then began trialing of metaphyseal and polyethylene components. We carefully evaluated soft tissue tension, impingement free range of motion movement, and stability throughout a full arc of movement. The final implants were selected and impacted. We utilized 3 mm of lateralization and 140 degree construct. We are very pleased that the tensioning and impingement free arc. .  The components were carefully irrigated with antibiotic infused saline which was left to sit for 90 seconds. We then placed a deep spinal needle for the final injection of tobramycin upon wound closure. The wound was then closed in layers including the deltopectoral, subcutaneous layer and a running subcuticular stitch with glue. The arm was placed into a sling carefully. Anesthesia reversed. Stable to recovery room.     Complications: None noted    Implants: Pamela [employer]    Postoperative plan: Patient will be discharged home. .  Encouraged early mobilization and pulmonary function. Physical therapy in the morning for the reverse total shoulder protocol. Encouraged home exercises and restrictions will be reviewed. Plan to discharge home if in stable condition. [facility]          This dictation was performed with a verbal recognition program (DRAGON) and it was checked for errors. It is possible that there are still dictated errors within this office note. If so, please bring any errors to my attention for an addendum. All efforts were made to ensure that this office note is accurate.     Electronically signed by Ricki Millan MD on 6/4/2020 at 1:43 PM

## 2020-06-04 NOTE — ANESTHESIA POSTPROCEDURE EVALUATION
05/29/2020 03:05 PM        PLT                      295                 05/21/2012 07:20 AM   RENAL  Lab Results       Component                Value               Date/Time                  NA                       139                 05/29/2020 03:05 PM        K                        4.1                 05/29/2020 03:05 PM        CL                       103                 05/29/2020 03:05 PM        CO2                      25                  05/29/2020 03:05 PM        BUN                      21 (H)              05/29/2020 03:05 PM        CREATININE               0.5 (L)             05/29/2020 03:05 PM        GLUCOSE                  94                  05/29/2020 03:05 PM        GLUCOSE                  99                  05/21/2012 07:20 AM   COAGS  Lab Results       Component                Value               Date/Time                  PROTIME                  10.8                05/29/2020 03:05 PM        INR                      0.93                05/29/2020 03:05 PM        APTT                     32.5                05/29/2020 03:05 PM     Intake & Output:  @00OCGF@    Nausea & Vomiting:  No    Level of Consciousness:  Awake    Pain Assessment:  Adequate analgesia    Anesthesia Complications:  No apparent anesthetic complications    SUMMARY      Vital signs stable  OK to discharge from Stage I post anesthesia care.   Care transferred from Anesthesiology department on discharge from perioperative area

## 2020-06-04 NOTE — BRIEF OP NOTE
Brief Postoperative Note      Patient: Robby Curry  YOB: 1955  MRN: [id number]    Date of Procedure: 6/4/2020    Pre-Op Diagnosis: LEFT SHOULDER ARTHROPATHY    Post-Op Diagnosis: B2 glenoid with posterior erosions and a deficient supraspinatus tendon       Procedure(s):  LEFT REVERSE TOTAL SHOULDER ARTHROPLASTY WITH INTERSCALENE BLOCK FOR PAIN CONTROL          [facility]    Surgeon(s):  Jam Dean MD    Assistant:  Surgical Assistant: Flavia Gant    Anesthesia: General    Estimated Blood Loss (mL): 775     Complications: None    Specimens:   * No specimens in log *    Implants:  Implant Name Type Inv.  Item Serial No.  Lot No. LRB No. Used Action   IMPL SHLDER WEDGE AUGMT BASEPLT 25MM Shoulder/Arm/Wrist/Hand IMPL SHLDER WEDGE AUGMT BASEPLT 25MM  [employer] [id number] Left 1 Implanted   SCREW PERFORM REV CENTRAL 6.5X30MM Screw/Plate/Nail/Sandip SCREW PERFORM REV CENTRAL 6.5X30MM  [employer]  Left 1 Implanted   SCREW PERFORM REV PERIPHERAL 5.0X18MM Screw/Plate/Nail/Sandip SCREW PERFORM REV PERIPHERAL 5.0X18MM  [employer]  Left 1 Implanted   SCREW PERF REVRSED PERIPHERAL 22MM Screw/Plate/Nail/Sandip SCREW PERF REVRSED PERIPHERAL 22MM  [employer]  Left 1 Implanted   SCREW PERFORM REV PERIPHERAL 26MM Screw/Plate/Nail/Sandip SCREW PERFORM REV PERIPHERAL 26MM  [employer]  Left 1 Implanted   IMPL SHOULDER GLENOSPHERE LAT 36MM +3MM Shoulder/Arm/Wrist/Hand IMPL SHOULDER GLENOSPHERE LAT 36MM +3MM  [employer] [id number] Left 1 Implanted   IMPL SHOULDER FLEX TRAY REV LOW OFFSET+0 Shoulder/Arm/Wrist/Hand IMPL SHOULDER FLEX TRAY REV LOW OFFSET+0  [employer] [id number] Left 1 Implanted   IMPL SHOULDER STEM FLEX ASCEND PTC Shoulder/Arm/Wrist/Hand IMPL SHOULDER STEM FLEX ASCEND PTC  [employer] [id number] Left 1 Implanted   IMPL INSERT REVERSED [id number] Shoulder/Arm/Wrist/Hand IMPL INSERT REVERSED [id number]  [employer] [id number] Left 1 Implanted         Drains: * No LDAs found *    Findings: Please see operative note    Electronically signed by Ariel Pond MD on 6/4/2020 at 1:43 PM

## 2020-06-04 NOTE — H&P
Department of Orthopedic Surgery  Attending   History and Physical        CHIEF COMPLAINT:  TSR    Reason for Admission: As Above    History Obtained From:  patient    HISTORY OF PRESENT ILLNESS:      The patient is a 76 y.o. female  who presents with no recent changes health       Past Medical History:        Diagnosis Date    Cancer ([address]     basal cell on left upper chest, SQUAMOUS L FOREARM    Hemangioma     T10    Hyperlipidemia     Hypertension     Osteoarthritis     PONV (postoperative nausea and vomiting)     Prolonged emergence from general anesthesia     POSSIBLY    Shingles     Severe 1985 and 1989 back and right hip, right leg. Past Surgical History:        Procedure Laterality Date    BACK SURGERY      BREAST SURGERY Right     cyst right    ENDOMETRIAL ABLATION      uterine    FOOT SURGERY      neuroma left foot    JOINT REPLACEMENT Bilateral 2017    RIGHT MARCH/ LEFT JULY; hips    KNEE ARTHROSCOPY      right    SHOULDER SURGERY Right     rotator cuff, BICEP    SKIN CANCER EXCISION  October 2015    left anterior upper chest    SKIN CANCER EXCISION  2-2016    mid chest area    SPINE SURGERY  01/27/2015    lumbar laminectomy, Dr. Tommy Cornejo  04/1998    cervical     TUMOR REMOVAL      left rib         Medications Prior to Admission:   Prior to Admission medications    Medication Sig Start Date End Date Taking? Authorizing Provider   Loratadine (CLARITIN PO) Take by mouth   Yes Historical Provider, MD   gabapentin (NEURONTIN) 400 MG capsule Take 1 capsule by mouth 3 times daily for 90 days.  5/18/20 8/16/20 Yes Herminio Nieves MD   tiZANidine (ZANAFLEX) 4 MG tablet Take 1 tablet by mouth 2 times daily as needed (muscle spasm and chronic pain) 5/18/20  Yes Herminio Nieves MD   meloxicam (MOBIC) 15 MG tablet Take 1 tablet by mouth daily (with food) 3- month supply 5/18/20 8/16/20 Yes Herminio Nieves MD   amLODIPine (NORVASC) 5 MG tablet Take 1 tablet by mouth daily 5/17/20  Yes Bebe Lawson MD   mupirocin (BACTROBAN) 2 % ointment Apply small amount to each nostril twice daily starting 5 days prior to surgery. See surgery instructions for more information. 2/5/20  Yes Gutierrez Dietz MD   benzoyl peroxide (BENZOYL PEROXIDE) 5 % external liquid Wash neck, shoulder and armpit. See surgery instructions for more information. 2/5/20  Yes Gutierrez Dietz MD   rosuvastatin (CRESTOR) 20 MG tablet TAKE 1 TABLET BY MOUTH EVERY DAY AT NIGHT  Patient taking differently: Take 20 mg by mouth daily  1/24/20  Yes Bebe Lawson MD   FLUoxetine (PROZAC) 10 MG tablet Take 1 tablet by mouth daily 12/21/18  Yes Bebe Lawson MD   CALCIUM/MAGNESIUM/ZINC FORMULA PO Take by mouth nightly    Historical Provider, MD   Turmeric 400 MG CAPS Take by mouth daily    Historical Provider, MD   Coenzyme Q10 (CO Q 10) 100 MG CAPS Take by mouth daily     Historical Provider, MD   aspirin 81 MG tablet Take 81 mg by mouth daily    Historical Provider, MD [facility] [phone] MG TABS Take by mouth Indications: 2 tablets daily     Historical Provider, MD   Multiple Vitamins-Minerals (THERAPEUTIC MULTIVITAMIN-MINERALS) tablet Take 1 tablet by mouth daily    Historical Provider, MD       Allergies:  Lipitor; Lisinopril; Lovenox [enoxaparin sodium]; Tramadol; and Adhesive tape    Social History:    Tobacco:  reports that she has never smoked. She has never used smokeless tobacco.   Alcohol:  reports current alcohol use.    Illicit Drug: No  Family History:       Problem Relation Age of Onset    Heart Disease Mother     Cancer Mother         cervix and parathyroid    Arthritis Mother     Heart Disease Father     Cancer Father         lung and brain mets    Cancer Sister         skin ca - SCC    Arthritis Sister     Cancer Sister         skin ca - NMSC    Cancer Sister         skin ca- NMSC    Arthritis Brother      REVIEW OF SYSTEMS:  CONSTITUTIONAL:  negative  MUSCULOSKELETAL: positive for  pain    PHYSICAL EXAM:  Admission weight: 149 lb (67.6 kg)  5' 1\" (154.9 cm)  VITALS:  BP (!) 157/91   Pulse 84   Temp 98.2 °F (36.8 °C) (Temporal)   Resp 16   Ht 5' 1\" (1.549 m)   Wt 146 lb 6.4 oz (66.4 kg)   SpO2 97%   BMI 27.66 kg/m²     CONSTITUTIONAL:  awake, alert, cooperative, no apparent distress, and appears stated age  Cardiac; rr  Pulm; cta b  MUSCULOSKELETAL:  There is no redness, warmth, or swelling of the joints. Full range of motion noted. Motor strength is 5 out of 5 all extremities bilaterally.   Tone is normal.    DATA:  CBC:   Lab Results   Component Value Date    WBC 8.3 05/29/2020    RBC 4.47 05/29/2020    RBC 4.31 05/21/2012    HGB 13.3 05/29/2020    HCT 40.3 05/29/2020    MCV 90.2 05/29/2020    MCH 29.8 05/29/2020    MCHC 33.1 05/29/2020    RDW 13.8 05/29/2020     05/29/2020     05/21/2012    MPV 8.5 05/29/2020     BMP:    Lab Results   Component Value Date     05/29/2020    K 4.1 05/29/2020     05/29/2020    CO2 25 05/29/2020    BUN 21 05/29/2020    LABALBU 4.2 05/29/2020    LABALBU 4.2 05/21/2012    CREATININE 0.5 05/29/2020    CALCIUM 9.4 05/29/2020    GFRAA >60 05/29/2020    LABGLOM >60 05/29/2020    GLUCOSE 94 05/29/2020    GLUCOSE 99 05/21/2012     PT/INR:    Lab Results   Component Value Date    PROTIME 10.8 05/29/2020    INR 0.93 05/29/2020       Radiology:  No orders to display         ASSESSMENT: TSR    PLAN:  1)  consent  2)  D/c  3)  Std periop care      Diana Scales

## 2020-06-05 ENCOUNTER — TELEPHONE (OUTPATIENT)
Dept: ORTHOPEDIC SURGERY | Age: 65
End: 2020-06-05

## 2020-06-05 NOTE — TELEPHONE ENCOUNTER
Spoke with pt, states she is doing really good. Incision status: No drainage or redness    Edema/Swelling/Teds: Barely having any swelling. Pain level and status: Barely having any pain, discussed pain levels. Use of pain medications: Not using any. Discussed tylenol instead of pain meds. Blood thinner: NA    Bowels: Taking a stool softener of her own making.      Home Care Agency active: NA    Outpatient therapy: NA    Do you have all of your medications: Yes    Changes in medications: no    Ortho Vitals: NA    Follow up appointments:    Future Appointments   Date Time Provider Fawn Alcocer   6/15/2020  9:15 AM Ratna Jimenez MD [facility]   6/15/2020 11:00 AM ALENA Sherman EG ALENA PETERSEN   8/17/2020  9:00 AM Brie Reyes MD R BANK PAIN [facility]   10/2/2020 10:00 AM MD eNtte Sheppard [facility]   10/13/2020 10:45 AM Daisy Dye DO And Derm [facility]

## 2020-06-11 ENCOUNTER — TELEPHONE (OUTPATIENT)
Dept: ORTHOPEDIC SURGERY | Age: 65
End: 2020-06-11

## 2020-06-15 ENCOUNTER — HOSPITAL ENCOUNTER (OUTPATIENT)
Dept: PHYSICAL THERAPY | Age: 65
Setting detail: THERAPIES SERIES
Discharge: HOME | End: 2020-06-15
Payer: MEDICARE

## 2020-06-15 ENCOUNTER — OFFICE VISIT (OUTPATIENT)
Dept: ORTHOPEDIC SURGERY | Age: 65
End: 2020-06-15

## 2020-06-15 ENCOUNTER — TELEPHONE (OUTPATIENT)
Dept: ORTHOPEDIC SURGERY | Age: 65
End: 2020-06-15

## 2020-06-15 VITALS — HEIGHT: 61 IN | WEIGHT: 146.39 LBS | BODY MASS INDEX: 27.64 KG/M2

## 2020-06-15 PROCEDURE — 97110 THERAPEUTIC EXERCISES: CPT | Performed by: PHYSICAL THERAPIST

## 2020-06-15 PROCEDURE — 99024 POSTOP FOLLOW-UP VISIT: CPT | Performed by: ORTHOPAEDIC SURGERY

## 2020-06-15 PROCEDURE — 97161 PT EVAL LOW COMPLEX 20 MIN: CPT | Performed by: PHYSICAL THERAPIST

## 2020-06-15 RX ORDER — OXYCODONE HYDROCHLORIDE AND ACETAMINOPHEN 5; 325 MG/1; MG/1
1 TABLET ORAL EVERY 6 HOURS PRN
Qty: 18 TABLET | Refills: 0 | Status: SHIPPED | OUTPATIENT
Start: 2020-06-15 | End: 2020-06-22

## 2020-06-15 NOTE — FLOWSHEET NOTE
Therapeutic Activity (77263)                                                     Therapeutic Exercise and NMR EXR  [x] (66920) Provided verbal/tactile cueing for activities related to strengthening, flexibility, endurance, ROM  for improvements in scapular, scapulothoracic and UE control with self care, reaching, carrying, lifting, house/yardwork, driving/computer work.    [] (49269) Provided verbal/tactile cueing for activities related to improving balance, coordination, kinesthetic sense, posture, motor skill, proprioception  to assist with  scapular, scapulothoracic and UE control with self care, reaching, carrying, lifting, house/yardwork, driving/computer work. Therapeutic Activities:    [] (24296 or 93199) Provided verbal/tactile cueing for activities related to improving balance, coordination, kinesthetic sense, posture, motor skill, proprioception and motor activation to allow for proper function of scapular, scapulothoracic and UE control with self care, carrying, lifting, driving/computer work.      Home Exercise Program:    [x] (47909) Reviewed/Progressed HEP activities related to strengthening, flexibility, endurance, ROM of scapular, scapulothoracic and UE control with self care, reaching, carrying, lifting, house/yardwork, driving/computer work  [] (88437) Reviewed/Progressed HEP activities related to improving balance, coordination, kinesthetic sense, posture, motor skill, proprioception of scapular, scapulothoracic and UE control with self care, reaching, carrying, lifting, house/yardwork, driving/computer work      Manual Treatments:  PROM / STM / Oscillations-Mobs:  G-I, II, III, IV (PA's, Inf., Post.)  [] (28702) Provided manual therapy to mobilize soft tissue/joints of cervical/CT, scapular GHJ and UE for the purpose of modulating pain, promoting relaxation,  increasing ROM, reducing/eliminating soft tissue swelling/inflammation/restriction, improving soft tissue extensibility and allowing

## 2020-06-15 NOTE — PLAN OF CARE
of time(patient specific functional goal)    [] Progressing: [] Met: [] Not Met: [] Adjusted      Electronically signed by:  Jag Albright PT

## 2020-06-23 ENCOUNTER — HOSPITAL ENCOUNTER (OUTPATIENT)
Dept: PHYSICAL THERAPY | Age: 65
Setting detail: THERAPIES SERIES
Discharge: HOME | End: 2020-06-23
Payer: MEDICARE

## 2020-06-23 PROCEDURE — 97140 MANUAL THERAPY 1/> REGIONS: CPT | Performed by: PHYSICAL THERAPY ASSISTANT

## 2020-06-23 PROCEDURE — 97110 THERAPEUTIC EXERCISES: CPT | Performed by: PHYSICAL THERAPY ASSISTANT

## 2020-06-30 ENCOUNTER — HOSPITAL ENCOUNTER (OUTPATIENT)
Dept: PHYSICAL THERAPY | Age: 65
Setting detail: THERAPIES SERIES
Discharge: HOME | End: 2020-06-30
Payer: MEDICARE

## 2020-06-30 PROCEDURE — 97140 MANUAL THERAPY 1/> REGIONS: CPT | Performed by: PHYSICAL THERAPIST

## 2020-06-30 PROCEDURE — 97110 THERAPEUTIC EXERCISES: CPT | Performed by: PHYSICAL THERAPIST

## 2020-06-30 NOTE — FLOWSHEET NOTE
[address] [facility], [address] [place] [address] [address]  Phone: [phone]   Fax:     [phone]      Physical Therapy Treatment Note/ Progress Report:     Date:  2020    Patient Name:  George Canales    :  1955  MRN: [id number]  Restrictions/Precautions:    Medical/Treatment Diagnosis Information:    M19.212 L shlder OA              2020 surgery  Reverse  ·   M25.512 L shldr pain      Insurance/Certification information:   [facility]  Physician Information:   chandrakant  Has the plan of care been signed (Y/N):        [x]  Yes  []  No     Date of Patient follow up with Physician: 2020      Is this a Progress Report:     []  Yes  [x]  No        If Yes:  Date Range for reporting period:  Beginning 6/15/2020  Ending     Progress report will be due (10 Rx or 30 days whichever is less):        Recertification will be due (POC Duration  / 90 days whichever is less): 9/15/2020       Visit # Insurance Allowable Auth Required   3 [facility] []  Yes [x]  No        Functional Scale: quick   Quick 84%    Date assessed:  6/15/2020      Latex Allergy:  [x]NO      []YES  Preferred Language for Healthcare:   [x]English       []other:      Pain level:  1-2/10     SUBJECTIVE:  Feels she is improving    OBJECTIVE:    Observation:    Test measurements:  PROM flexion 138  ER 38 @ 65ABD    RESTRICTIONS/PRECAUTIONS:  Sling, no ER past 60 degress, no resisted ER 6 weeks    Exercises/Interventions: SX 2020 R reverse  Therapeutic Ex (90560) Sets/sec Reps Notes/CUES HEP   Shrugs  Retraction  Elbow flex/ext  ROM  Wrist ROM  AA shldr flex opp arm assists   Table slides flex  pendulums  10  x20  x20  10  10  15  reviewed Cues for all ex X  X  X  X  X  X  X    Standing ER stretch with wand  x20 Pain free, limited range    supine shldr flex elbow bent  SL ER   SL shldr ABD elbow bent  Wall slides  10  8  8  8 VC  VC  VC  VC X  X  X  X           Wand flexion  x10 Pain free ROM x   Wand ER  x10            UT stretch  x   Levator stretch  x                        Manual Intervention (16745)       PROM x10'                                         NMR re-education (39686)   CUES NEEDED                                                                   Therapeutic Activity (89666)                                                     Therapeutic Exercise and NMR EXR  [x] (63969) Provided verbal/tactile cueing for activities related to strengthening, flexibility, endurance, ROM  for improvements in scapular, scapulothoracic and UE control with self care, reaching, carrying, lifting, house/yardwork, driving/computer work.    [] (14423) Provided verbal/tactile cueing for activities related to improving balance, coordination, kinesthetic sense, posture, motor skill, proprioception  to assist with  scapular, scapulothoracic and UE control with self care, reaching, carrying, lifting, house/yardwork, driving/computer work. Therapeutic Activities:    [] (03066 or 26908) Provided verbal/tactile cueing for activities related to improving balance, coordination, kinesthetic sense, posture, motor skill, proprioception and motor activation to allow for proper function of scapular, scapulothoracic and UE control with self care, carrying, lifting, driving/computer work.      Home Exercise Program:    [x] (19150) Reviewed/Progressed HEP activities related to strengthening, flexibility, endurance, ROM of scapular, scapulothoracic and UE control with self care, reaching, carrying, lifting, house/yardwork, driving/computer work  [] (85150) Reviewed/Progressed HEP activities related to improving balance, coordination, kinesthetic sense, posture, motor skill, proprioception of scapular, scapulothoracic and UE control with self care, reaching, carrying, lifting, house/yardwork, driving/computer work      Manual Treatments:  PROM / STM / Oscillations-Mobs:  G-I, II, III, IV (PA's, Inf., Post.)  [x] (46662) Provided manual therapy to mobilize soft tissue/joints of cervical/CT, scapular GHJ and UE for the purpose of modulating pain, promoting relaxation,  increasing ROM, reducing/eliminating soft tissue swelling/inflammation/restriction, improving soft tissue extensibility and allowing for proper ROM for normal function with self care, reaching, carrying, lifting, house/yardwork, driving/computer work    Modalities:     [] GAME READY (VASO)- for significant edema, swelling, pain control. Charges:  Timed Code Treatment Minutes: 40   Total Treatment Minutes: 40'      [] EVAL (LOW) 33690 (typically 20 minutes face-to-face)  [] EVAL (MOD) 69384 (typically 30 minutes face-to-face)  [] EVAL (HIGH) 38428 (typically 45 minutes face-to-face)  [] RE-EVAL     [x] UE(92290) x2     [] IONTO  [] NMR (27718) x     [] VASO  [x] Manual (74829) x 1    [] Other:  [] TA x      [] Mech Traction (81168)  [] ES(attended) (86049)      [] ES (un) (23464):    ASSESSMENT:  ROM increased. Added to HEP. progressing      GOALS:   Patient stated goal: increase motion     Therapist goals for Patient:   Short Term Goals: To be achieved in: 2 weeks  1. Independent in HEP and progression per patient tolerance, in order to prevent re-injury. [x]? Progressing: []? Met: []? Not Met: []? Adjusted      2. Patient will have a decrease in pain to facilitate improvement in movement, function, and ADLs as indicated by Functional Deficits. [x]? Progressing: []? Met: []? Not Met: []? Adjusted      Long Term Goals: To be achieved in: 12 weeks  1. Disability index score of 42% or less for the DASH to assist with reaching prior level of function. [x]? Progressing: []? Met: []? Not Met: []? Adjusted      2. Patient will demonstrate increased AROM to 120 shldr flex to allow for proper joint functioning as indicated by patients Functional Deficits. [x]? Progressing: []? Met: []? Not Met: []? Adjusted      3.  Patient will demonstrate an increase in Strength to 5/5 to allow for proper functional mobility as indicated by patients Functional Deficits. [x]? Progressing: []? Met: []? Not Met: []? Adjusted      4. Patient will return to full functional activities without increased symptoms or restriction. [x]? Progressing: []? Met: []? Not Met: []? Adjusted      5. Sleep through the night 100% of time(patient specific functional goal)    [x]? Progressing: []? Met: []? Not Met: []? Adjusted              Overall Progression Towards Functional goals/ Treatment Progress Update:  [x] Patient is progressing as expected towards functional goals listed. [] Progression is slowed due to complexities/Impairments listed. [] Progression has been slowed due to co-morbidities. [] Plan just implemented, too soon to assess goals progression <30days   [] Goals require adjustment due to lack of progress  [] Patient is not progressing as expected and requires additional follow up with physician  [] Other    Prognosis for POC: [x] Good [] Fair  [] Poor      Patient requires continued skilled intervention: [x] Yes  [] No    Treatment/Activity Tolerance:  [x] Patient able to complete treatment  [] Patient limited by fatigue  [] Patient limited by pain    [] Patient limited by other medical complications  [] Other:       PLAN: See eval  [x] Continue per plan of care [] Alter current plan (see comments above)  [] Plan of care initiated [] Hold pending MD visit [] Discharge    Electronically signed by:  Trice Griffin PT,     Note: If patient does not return for scheduled/ recommended follow up visits, this note will serve as a discharge from care along with most recent update on progress.

## 2020-07-02 ENCOUNTER — HOSPITAL ENCOUNTER (OUTPATIENT)
Dept: PHYSICAL THERAPY | Age: 65
Setting detail: THERAPIES SERIES
Discharge: HOME | End: 2020-07-02
Payer: MEDICARE

## 2020-07-02 PROCEDURE — 97110 THERAPEUTIC EXERCISES: CPT

## 2020-07-02 PROCEDURE — 97124 MASSAGE THERAPY: CPT

## 2020-07-02 NOTE — FLOWSHEET NOTE
[address] [facility], [address] [place] [address], [address] [place] [address] [address]  Phone: [phone]   Fax:     [phone]      Physical Therapy Treatment Note/ Progress Report:     Date:  2020    Patient Name:  Neeta Kurtz    :  1955  MRN: [id number]  Restrictions/Precautions:    Medical/Treatment Diagnosis Information:    M19.212 L shlder OA              2020 surgery  Reverse  ·   M25.512 L shldr pain      Insurance/Certification information:   [facility]  Physician Information:   chandrakant  Has the plan of care been signed (Y/N):        [x]  Yes  []  No     Date of Patient follow up with Physician: 2020      Is this a Progress Report:     []  Yes  [x]  No        If Yes:  Date Range for reporting period:  Beginning 6/15/2020  Ending     Progress report will be due (10 Rx or 30 days whichever is less):        Recertification will be due (POC Duration  / 90 days whichever is less): 9/15/2020       Visit # Insurance Allowable Auth Required   4 [facility] []  Yes [x]  No        Functional Scale: quick   Quick 84%    Date assessed:  6/15/2020      Latex Allergy:  [x]NO      []YES  Preferred Language for Healthcare:   [x]English       []other:      Pain level:  1-2/10     SUBJECTIVE:  Reports she has been able to increase reps at home, happy with progress. Less pain in shoulder, occasionally feels pull at incisions.      OBJECTIVE:    Observation:    Test measurements:  PROM flexion 138  ER 38 @ 65ABD    RESTRICTIONS/PRECAUTIONS:  Sling, no ER past 60 degress, no resisted ER 6 weeks    Exercises/Interventions: SX 2020 R reverse  Therapeutic Ex (52683) Sets/sec Reps Notes/CUES HEP   Shrugs  Retraction  Elbow flex/ext  ROM  Wrist ROM  AA shldr flex opp arm assists   Table slides flex  pendulums  10  x20  x20  10  10  15  reviewed Cues for all ex X  X  X  X  X  X  X    Standing ER stretch with wand  10\"x20 Pain free, limited range    supine shldr flex elbow bent  SL ER   SL shldr ABD elbow bent  Wall slides  10  2x10  2x10  8 VC  VC  VC  VC X  X  X  X    Pulleys 5 min  Pain free     Cane flexion  10\"x10 Pain free ROM x          Cane press  2x10     UT stretch  x   Levator stretch  x                        Manual Intervention (10390)       PROM x10'                                         NMR re-education (21239)   CUES NEEDED                                                                   Therapeutic Activity (70217)                                                     Therapeutic Exercise and NMR EXR  [x] (58770) Provided verbal/tactile cueing for activities related to strengthening, flexibility, endurance, ROM  for improvements in scapular, scapulothoracic and UE control with self care, reaching, carrying, lifting, house/yardwork, driving/computer work.    [] (61550) Provided verbal/tactile cueing for activities related to improving balance, coordination, kinesthetic sense, posture, motor skill, proprioception  to assist with  scapular, scapulothoracic and UE control with self care, reaching, carrying, lifting, house/yardwork, driving/computer work. Therapeutic Activities:    [] (34823 or 77715) Provided verbal/tactile cueing for activities related to improving balance, coordination, kinesthetic sense, posture, motor skill, proprioception and motor activation to allow for proper function of scapular, scapulothoracic and UE control with self care, carrying, lifting, driving/computer work.      Home Exercise Program:    [x] (37981) Reviewed/Progressed HEP activities related to strengthening, flexibility, endurance, ROM of scapular, scapulothoracic and UE control with self care, reaching, carrying, lifting, house/yardwork, driving/computer work  [] (61556) Reviewed/Progressed HEP activities related to improving balance, coordination, kinesthetic sense, posture, motor skill, proprioception of scapular, scapulothoracic and UE control with self care, reaching, carrying, lifting, house/yardwork, driving/computer work      Manual Treatments:  PROM / STM / Oscillations-Mobs:  G-I, II, III, IV (PA's, Inf., Post.)  [x] (80557) Provided manual therapy to mobilize soft tissue/joints of cervical/CT, scapular GHJ and UE for the purpose of modulating pain, promoting relaxation,  increasing ROM, reducing/eliminating soft tissue swelling/inflammation/restriction, improving soft tissue extensibility and allowing for proper ROM for normal function with self care, reaching, carrying, lifting, house/yardwork, driving/computer work    Modalities:     [] GAME READY (VASO)- for significant edema, swelling, pain control. Charges:  Timed Code Treatment Minutes: 40   Total Treatment Minutes: 40'      [] EVAL (LOW) 94573 (typically 20 minutes face-to-face)  [] EVAL (MOD) 84610 (typically 30 minutes face-to-face)  [] EVAL (HIGH) 51125 (typically 45 minutes face-to-face)  [] RE-EVAL     [x] AF(20114) x2     [] IONTO  [] NMR (87434) x     [] VASO  [x] Manual (67439) x 1    [] Other:  [] TA x      [] Mech Traction (26201)  [] ES(attended) (40108)      [] ES (un) (51051):    ASSESSMENT:  ROM progressing. Increased reps with no difficulty. No symptoms reported      GOALS:   Patient stated goal: increase motion     Therapist goals for Patient:   Short Term Goals: To be achieved in: 2 weeks  1. Independent in HEP and progression per patient tolerance, in order to prevent re-injury. [x]? Progressing: []? Met: []? Not Met: []? Adjusted      2. Patient will have a decrease in pain to facilitate improvement in movement, function, and ADLs as indicated by Functional Deficits. [x]? Progressing: []? Met: []? Not Met: []? Adjusted      Long Term Goals: To be achieved in: 12 weeks  1. Disability index score of 42% or less for the DASH to assist with reaching prior level of function. [x]? Progressing: []? Met: []? Not Met: []? Adjusted      2.  Patient will demonstrate increased AROM to 120 shldr flex to allow for proper joint functioning as indicated by patients Functional Deficits. [x]? Progressing: []? Met: []? Not Met: []? Adjusted      3. Patient will demonstrate an increase in Strength to 5/5 to allow for proper functional mobility as indicated by patients Functional Deficits. [x]? Progressing: []? Met: []? Not Met: []? Adjusted      4. Patient will return to full functional activities without increased symptoms or restriction. [x]? Progressing: []? Met: []? Not Met: []? Adjusted      5. Sleep through the night 100% of time(patient specific functional goal)    [x]? Progressing: []? Met: []? Not Met: []? Adjusted              Overall Progression Towards Functional goals/ Treatment Progress Update:  [x] Patient is progressing as expected towards functional goals listed. [] Progression is slowed due to complexities/Impairments listed. [] Progression has been slowed due to co-morbidities. [] Plan just implemented, too soon to assess goals progression <30days   [] Goals require adjustment due to lack of progress  [] Patient is not progressing as expected and requires additional follow up with physician  [] Other    Prognosis for POC: [x] Good [] Fair  [] Poor      Patient requires continued skilled intervention: [x] Yes  [] No    Treatment/Activity Tolerance:  [x] Patient able to complete treatment  [] Patient limited by fatigue  [] Patient limited by pain    [] Patient limited by other medical complications  [] Other:       PLAN: See eval  [x] Continue per plan of care [] Alter current plan (see comments above)  [] Plan of care initiated [] Hold pending MD visit [] Discharge    Electronically signed by:  Brie Ying PTA,     Note: If patient does not return for scheduled/ recommended follow up visits, this note will serve as a discharge from care along with most recent update on progress.

## 2020-07-06 ENCOUNTER — HOSPITAL ENCOUNTER (OUTPATIENT)
Dept: PHYSICAL THERAPY | Age: 65
Setting detail: THERAPIES SERIES
Discharge: HOME | End: 2020-07-06
Payer: MEDICARE

## 2020-07-06 PROCEDURE — 97140 MANUAL THERAPY 1/> REGIONS: CPT

## 2020-07-06 PROCEDURE — 97110 THERAPEUTIC EXERCISES: CPT

## 2020-07-06 NOTE — FLOWSHEET NOTE
723 [facility], [address] [place] [address], [address] [place] [address] [address]  Phone: [phone]   Fax:     [phone]      Physical Therapy Treatment Note/ Progress Report:     Date:  2020    Patient Name:  Alka Burch    :  1955  MRN: [id number]  Restrictions/Precautions:    Medical/Treatment Diagnosis Information:    M19.212 L shlder OA              2020 surgery  Reverse  ·   M25.512 L shldr pain      Insurance/Certification information:   [facility]  Physician Information:   chandrakant  Has the plan of care been signed (Y/N):        [x]  Yes  []  No     Date of Patient follow up with Physician: 2020      Is this a Progress Report:     []  Yes  [x]  No        If Yes:  Date Range for reporting period:  Beginning 6/15/2020  Ending     Progress report will be due (10 Rx or 30 days whichever is less): 3653       Recertification will be due (POC Duration  / 90 days whichever is less): 9/15/2020       Visit # Insurance Allowable Auth Required   5 [facility] []  Yes [x]  No        Functional Scale: quick   Quick 84%    Date assessed:  6/15/2020      Latex Allergy:  [x]NO      []YES  Preferred Language for Healthcare:   [x]English       []other:      Pain level:  3/10     SUBJECTIVE:  Reports she overdid it this weekend, was making pie crusts and made her elbow hurt.      OBJECTIVE:    Observation:    Test measurements:  PROM flexion 138  ER 38 @ 65ABD    RESTRICTIONS/PRECAUTIONS:  Sling, no ER past 60 degress, no resisted ER 6 weeks    Exercises/Interventions: SX 2020 R reverse  Therapeutic Ex (40659) Sets/sec Reps Notes/CUES HEP   Shrugs  Retraction  Elbow flex/ext  ROM  Wrist ROM  AA shldr flex opp arm assists   Table slides flex  pendulums  10  x20  x20  10  10  15  reviewed Cues for all ex X  X  X  X  X  X  X    Standing ER stretch with wand  10\"x20 Pain free, limited range    supine shldr flex elbow bent  SL ER   SL shldr ABD elbow bent  Wall slides  10  3x10  3x10  8 VC  VC  VC  VC X  X  X  X    Pulleys 5 min  Pain free     Cane flexion  10\"x10 Pain free ROM x   Cane press  2x10     Bicep curl  3x10 1#    TB row  2x10 Lime. Max cues for neutral                                Manual Intervention ([id number])       PROM x10'                                         NMR re-education (40080)   CUES NEEDED                                                                   Therapeutic Activity (18670)                                                     Therapeutic Exercise and NMR EXR  [x] (75289) Provided verbal/tactile cueing for activities related to strengthening, flexibility, endurance, ROM  for improvements in scapular, scapulothoracic and UE control with self care, reaching, carrying, lifting, house/yardwork, driving/computer work.    [] (91649) Provided verbal/tactile cueing for activities related to improving balance, coordination, kinesthetic sense, posture, motor skill, proprioception  to assist with  scapular, scapulothoracic and UE control with self care, reaching, carrying, lifting, house/yardwork, driving/computer work. Therapeutic Activities:    [] (26191 or 50790) Provided verbal/tactile cueing for activities related to improving balance, coordination, kinesthetic sense, posture, motor skill, proprioception and motor activation to allow for proper function of scapular, scapulothoracic and UE control with self care, carrying, lifting, driving/computer work.      Home Exercise Program:    [x] (79278) Reviewed/Progressed HEP activities related to strengthening, flexibility, endurance, ROM of scapular, scapulothoracic and UE control with self care, reaching, carrying, lifting, house/yardwork, driving/computer work  [] (33263) Reviewed/Progressed HEP activities related to improving balance, coordination, kinesthetic sense, posture, motor skill, proprioception of scapular, scapulothoracic and UE control with self care, reaching, carrying, lifting, house/yardwork, driving/computer work      Manual Treatments:  PROM / STM / Oscillations-Mobs:  G-I, II, III, IV (PA's, Inf., Post.)  [x] (44227) Provided manual therapy to mobilize soft tissue/joints of cervical/CT, scapular GHJ and UE for the purpose of modulating pain, promoting relaxation,  increasing ROM, reducing/eliminating soft tissue swelling/inflammation/restriction, improving soft tissue extensibility and allowing for proper ROM for normal function with self care, reaching, carrying, lifting, house/yardwork, driving/computer work    Modalities:     [] GAME READY (VASO)- for significant edema, swelling, pain control. Charges:  Timed Code Treatment Minutes: 40   Total Treatment Minutes: 40'      [] EVAL (LOW) 01195 (typically 20 minutes face-to-face)  [] EVAL (MOD) 55284 (typically 30 minutes face-to-face)  [] EVAL (HIGH) 64983 (typically 45 minutes face-to-face)  [] RE-EVAL     [x] TY(64563) x2     [] IONTO  [] NMR (77818) x     [] VASO  [x] Manual (56650) x 1    [] Other:  [] TA x      [] Mech Traction (36993)  [] ES(attended) (30857)      [] ES (un) (19165):    ASSESSMENT:  ROM progressing. Increased reps with no difficulty. Added TB row for scapular stability, max cues not to extend. GOALS:   Patient stated goal: increase motion     Therapist goals for Patient:   Short Term Goals: To be achieved in: 2 weeks  1. Independent in HEP and progression per patient tolerance, in order to prevent re-injury. [x]? Progressing: []? Met: []? Not Met: []? Adjusted      2. Patient will have a decrease in pain to facilitate improvement in movement, function, and ADLs as indicated by Functional Deficits. [x]? Progressing: []? Met: []? Not Met: []? Adjusted      Long Term Goals: To be achieved in: 12 weeks  1. Disability index score of 42% or less for the DASH to assist with reaching prior level of function. [x]? Progressing: []? Met: []? Not Met: []? Adjusted      2.  Patient will demonstrate increased AROM to 120 shldr flex to allow for proper joint functioning as indicated by patients Functional Deficits. [x]? Progressing: []? Met: []? Not Met: []? Adjusted      3. Patient will demonstrate an increase in Strength to 5/5 to allow for proper functional mobility as indicated by patients Functional Deficits. [x]? Progressing: []? Met: []? Not Met: []? Adjusted      4. Patient will return to full functional activities without increased symptoms or restriction. [x]? Progressing: []? Met: []? Not Met: []? Adjusted      5. Sleep through the night 100% of time(patient specific functional goal)    [x]? Progressing: []? Met: []? Not Met: []? Adjusted              Overall Progression Towards Functional goals/ Treatment Progress Update:  [x] Patient is progressing as expected towards functional goals listed. [] Progression is slowed due to complexities/Impairments listed. [] Progression has been slowed due to co-morbidities. [] Plan just implemented, too soon to assess goals progression <30days   [] Goals require adjustment due to lack of progress  [] Patient is not progressing as expected and requires additional follow up with physician  [] Other    Prognosis for POC: [x] Good [] Fair  [] Poor      Patient requires continued skilled intervention: [x] Yes  [] No    Treatment/Activity Tolerance:  [x] Patient able to complete treatment  [] Patient limited by fatigue  [] Patient limited by pain    [] Patient limited by other medical complications  [] Other:       PLAN: See eval  [x] Continue per plan of care [] Alter current plan (see comments above)  [] Plan of care initiated [] Hold pending MD visit [] Discharge    Electronically signed by:  Chacho Worthington PTA,     Note: If patient does not return for scheduled/ recommended follow up visits, this note will serve as a discharge from care along with most recent update on progress.

## 2020-07-08 ENCOUNTER — HOSPITAL ENCOUNTER (OUTPATIENT)
Dept: PHYSICAL THERAPY | Age: 65
Setting detail: THERAPIES SERIES
Discharge: HOME | End: 2020-07-08
Payer: MEDICARE

## 2020-07-08 PROCEDURE — 97110 THERAPEUTIC EXERCISES: CPT | Performed by: PHYSICAL THERAPIST

## 2020-07-08 PROCEDURE — 97140 MANUAL THERAPY 1/> REGIONS: CPT | Performed by: PHYSICAL THERAPIST

## 2020-07-08 NOTE — FLOWSHEET NOTE
[address] [facility], [address] [place] [address], [address] [place] [address] [address]  Phone: [phone]   Fax:     [phone]      Physical Therapy Treatment Note/ Progress Report:     Date:  2020    Patient Name:  Spenser Olson    :  1955  MRN: [id number]  Restrictions/Precautions:    Medical/Treatment Diagnosis Information:    M19.212 L shlder OA              2020 surgery  Reverse  ·   M25.512 L shldr pain      Insurance/Certification information:   [facility]  Physician Information:   chandrakant  Has the plan of care been signed (Y/N):        [x]  Yes  []  No     Date of Patient follow up with Physician: 2020      Is this a Progress Report:     []  Yes  [x]  No        If Yes:  Date Range for reporting period:  Beginning 6/15/2020  Ending     Progress report will be due (10 Rx or 30 days whichever is less):        Recertification will be due (POC Duration  / 90 days whichever is less): 9/15/2020       Visit # Insurance Allowable Auth Required   6 [facility] []  Yes [x]  No        Functional Scale: quick   Quick 84%    Date assessed:  6/15/2020      Latex Allergy:  [x]NO      []YES  Preferred Language for Healthcare:   [x]English       []other:      Pain level:  3/10     SUBJECTIVE:  Doing better than last time. States her ant shdlr feels hot.      OBJECTIVE:    Observation:    Test measurements:  PROM flexion 136  ER 38 @ 65ABD    RESTRICTIONS/PRECAUTIONS:  Sling, no ER past 30 degress, no resisted IR 6 weeks    Exercises/Interventions: SX 2020 R reverse  Therapeutic Ex (33470) Sets/sec Reps Notes/CUES HEP   Shrugs  Retraction  Elbow flex/ext  ROM  Wrist ROM  AA shldr flex opp arm assists   Table slides flex  pendulums  10  x20  x20  10  10  15  reviewed Cues for all ex X  X  X  X  X  X  X    Standing ER stretch with wand  10\"x20 Pain free, limited range    supine shldr flex elbow bent  SL ER   SL shldr ABD elbow bent  Wall slides  25  3x10  3x10  8 VC  VC  VC  VC X  X  X  X    Pulleys 5 min  Pain free     Cane flexion  10\"x10 Pain free ROM x   Cane press  2x10     Bicep curl  20 1#    TB row  Ball on rebounder  Standing PRE shdlr flex  shdlr PRE ABD  30  Alphabet  9  20   Lime. Max cues for neutral     X   X                               Manual Intervention ([id number])       PROM x10'                                         NMR re-education (67966)   CUES NEEDED                                                                   Therapeutic Activity (48222)                                                     Therapeutic Exercise and NMR EXR  [x] (88908) Provided verbal/tactile cueing for activities related to strengthening, flexibility, endurance, ROM  for improvements in scapular, scapulothoracic and UE control with self care, reaching, carrying, lifting, house/yardwork, driving/computer work.    [] (96559) Provided verbal/tactile cueing for activities related to improving balance, coordination, kinesthetic sense, posture, motor skill, proprioception  to assist with  scapular, scapulothoracic and UE control with self care, reaching, carrying, lifting, house/yardwork, driving/computer work. Therapeutic Activities:    [] (91604 or 12907) Provided verbal/tactile cueing for activities related to improving balance, coordination, kinesthetic sense, posture, motor skill, proprioception and motor activation to allow for proper function of scapular, scapulothoracic and UE control with self care, carrying, lifting, driving/computer work.      Home Exercise Program:    [x] (09605) Reviewed/Progressed HEP activities related to strengthening, flexibility, endurance, ROM of scapular, scapulothoracic and UE control with self care, reaching, carrying, lifting, house/yardwork, driving/computer work  [] (86456) Reviewed/Progressed HEP activities related to improving balance, coordination, kinesthetic sense, posture, motor skill, proprioception of scapular, scapulothoracic and UE control with self care, reaching, carrying, lifting, house/yardwork, driving/computer work      Manual Treatments:  PROM / STM / Oscillations-Mobs:  G-I, II, III, IV (Marianela, Inf., Post.)  [x] (33531) Provided manual therapy to mobilize soft tissue/joints of cervical/CT, scapular GHJ and UE for the purpose of modulating pain, promoting relaxation,  increasing ROM, reducing/eliminating soft tissue swelling/inflammation/restriction, improving soft tissue extensibility and allowing for proper ROM for normal function with self care, reaching, carrying, lifting, house/yardwork, driving/computer work    Modalities:     [] GAME READY (VASO)- for significant edema, swelling, pain control. Charges:  Timed Code Treatment Minutes: 40   Total Treatment Minutes: 40'      [] EVAL (LOW) 35944 (typically 20 minutes face-to-face)  [] EVAL (MOD) 82905 (typically 30 minutes face-to-face)  [] EVAL (HIGH) 33642 (typically 45 minutes face-to-face)  [] RE-EVAL     [x] FT(30728) x2     [] IONTO  [] NMR (50030) x     [] VASO  [x] Manual (07830) x 1    [] Other:  [] TA x      [] Mech Traction (88288)  [] ES(attended) (76729)      [] ES (un) (67750):    ASSESSMENT:  Instructed pt not to push into pain with ex. Added to HEP      GOALS:   Patient stated goal: increase motion     Therapist goals for Patient:   Short Term Goals: To be achieved in: 2 weeks  1. Independent in HEP and progression per patient tolerance, in order to prevent re-injury. [x]? Progressing: []? Met: []? Not Met: []? Adjusted      2. Patient will have a decrease in pain to facilitate improvement in movement, function, and ADLs as indicated by Functional Deficits. [x]? Progressing: []? Met: []? Not Met: []? Adjusted      Long Term Goals: To be achieved in: 12 weeks  1. Disability index score of 42% or less for the DASH to assist with reaching prior level of function. [x]? Progressing: []? Met: []? Not Met: []? Adjusted      2.  Patient will demonstrate increased AROM to 120 shldr flex to allow for proper joint functioning as indicated by patients Functional Deficits. [x]? Progressing: []? Met: []? Not Met: []? Adjusted      3. Patient will demonstrate an increase in Strength to 5/5 to allow for proper functional mobility as indicated by patients Functional Deficits. [x]? Progressing: []? Met: []? Not Met: []? Adjusted      4. Patient will return to full functional activities without increased symptoms or restriction. [x]? Progressing: []? Met: []? Not Met: []? Adjusted      5. Sleep through the night 100% of time(patient specific functional goal)    [x]? Progressing: []? Met: []? Not Met: []? Adjusted              Overall Progression Towards Functional goals/ Treatment Progress Update:  [x] Patient is progressing as expected towards functional goals listed. [] Progression is slowed due to complexities/Impairments listed. [] Progression has been slowed due to co-morbidities. [] Plan just implemented, too soon to assess goals progression <30days   [] Goals require adjustment due to lack of progress  [] Patient is not progressing as expected and requires additional follow up with physician  [] Other    Prognosis for POC: [x] Good [] Fair  [] Poor      Patient requires continued skilled intervention: [x] Yes  [] No    Treatment/Activity Tolerance:  [x] Patient able to complete treatment  [] Patient limited by fatigue  [] Patient limited by pain    [] Patient limited by other medical complications  [] Other:       PLAN: See eval  [x] Continue per plan of care [] Alter current plan (see comments above)  [] Plan of care initiated [] Hold pending MD visit [] Discharge    Electronically signed by:  Ciara Quiles PT,     Note: If patient does not return for scheduled/ recommended follow up visits, this note will serve as a discharge from care along with most recent update on progress.

## 2020-07-13 ENCOUNTER — OFFICE VISIT (OUTPATIENT)
Dept: ORTHOPEDIC SURGERY | Age: 65
End: 2020-07-13

## 2020-07-13 ENCOUNTER — HOSPITAL ENCOUNTER (OUTPATIENT)
Dept: PHYSICAL THERAPY | Age: 65
Setting detail: THERAPIES SERIES
Discharge: HOME | End: 2020-07-13
Payer: MEDICARE

## 2020-07-13 VITALS — HEIGHT: 61 IN | WEIGHT: 146 LBS | BODY MASS INDEX: 27.56 KG/M2

## 2020-07-13 PROCEDURE — 99024 POSTOP FOLLOW-UP VISIT: CPT | Performed by: ORTHOPAEDIC SURGERY

## 2020-07-13 PROCEDURE — 97110 THERAPEUTIC EXERCISES: CPT

## 2020-07-13 PROCEDURE — 97140 MANUAL THERAPY 1/> REGIONS: CPT

## 2020-07-13 NOTE — PROGRESS NOTES
723 [facility], [address] [place] [address], [address] [place] [address] [address]  Phone: [phone]   Fax:     [phone]      Physical Therapy Treatment Note/ Progress Report:     Date:  2020    Patient Name:  Kin Villegas    :  1955  MRN: [id number]  Restrictions/Precautions:    Medical/Treatment Diagnosis Information:    M19.212 L shlder OA              2020 surgery  Reverse  ·   M25.512 L shldr pain      Insurance/Certification information:    [facility] [address]  Physician Information:   chandrakant  Has the plan of care been signed (Y/N):        [x]  Yes  []  No     Date of Patient follow up with Physician: 2020      Is this a Progress Report:     [x]  Yes  []  No        If Yes:  Date Range for reporting period:  Beginning 6/15/2020  Ending     Progress report will be due (10 Rx or 30 days whichever is less): 8/10/9489       Recertification will be due (POC Duration  / 90 days whichever is less): 9/15/2020       Visit # Insurance Allowable Auth Required   [address] [facility] [address] []  Yes [x]  No        Functional Scale: Quick Dash 52%    Date assessed:  2020      Latex Allergy:  [x]NO      []YES  Preferred Language for Healthcare:   [x]English       []other:      Pain level:  3/10     SUBJECTIVE:  Came from MD appointment. Discontinue sling, having some neck pain, shoulder is a little stiff.       OBJECTIVE:    Observation:    Test measurements:  PROM 20: flexion 155  ER 40 @ 65ABD    RESTRICTIONS/PRECAUTIONS:  Sling, no ER past 30 degress, no resisted IR 6 weeks    Exercises/Interventions: SX 2020 R reverse  Therapeutic Ex (75402) Sets/sec Reps Notes/CUES HEP   Shrugs  Retraction  Elbow flex/ext  ROM  Wrist ROM  AA shldr flex opp arm assists   Table slides flex  pendulums  10  x20  x20  10  10  15  reviewed Cues for all ex X  X  X  X  X  X  X    Standing ER stretch with wand  10\"x20 Pain free, limited range    supine shldr flex elbow bent  SL ER   SL shldr ABD   Wall slides  25  1# x 20  3x10  10 VC  VC  VC  VC X  X  X  X    Pulleys 5 min  Pain free     Cane flexion  10\"x10 Pain free ROM x   Cane press  2x10     Bicep curl  30 1#    TB row  Ball on rebounder  Standing PRE shdlr flex  shdlr PRE ABD  30  Alphabet  20  20   Lime. Max cues for neutral     X   X   TB ER  2x10 Lime    TB Triceps  2x10 Lime                  Manual Intervention (52228)       PROM x10'                                         NMR re-education (94674)   CUES NEEDED                                                                   Therapeutic Activity (43473)                                                     Therapeutic Exercise and NMR EXR  [x] (34099) Provided verbal/tactile cueing for activities related to strengthening, flexibility, endurance, ROM  for improvements in scapular, scapulothoracic and UE control with self care, reaching, carrying, lifting, house/yardwork, driving/computer work.    [] (19123) Provided verbal/tactile cueing for activities related to improving balance, coordination, kinesthetic sense, posture, motor skill, proprioception  to assist with  scapular, scapulothoracic and UE control with self care, reaching, carrying, lifting, house/yardwork, driving/computer work. Therapeutic Activities:    [] (87225 or 41202) Provided verbal/tactile cueing for activities related to improving balance, coordination, kinesthetic sense, posture, motor skill, proprioception and motor activation to allow for proper function of scapular, scapulothoracic and UE control with self care, carrying, lifting, driving/computer work.      Home Exercise Program:    [x] (64628) Reviewed/Progressed HEP activities related to strengthening, flexibility, endurance, ROM of scapular, scapulothoracic and UE control with self care, reaching, carrying, lifting, house/yardwork, driving/computer work  [] (46768) Reviewed/Progressed HEP activities related to improving balance, coordination, kinesthetic sense, reaching prior level of function. [x]? Progressing: []? Met: []? Not Met: []? Adjusted      2. Patient will demonstrate increased AROM to 120 shldr flex to allow for proper joint functioning as indicated by patients Functional Deficits. [x]? Progressing: []? Met: []? Not Met: []? Adjusted      3. Patient will demonstrate an increase in Strength to 5/5 to allow for proper functional mobility as indicated by patients Functional Deficits. [x]? Progressing: []? Met: []? Not Met: []? Adjusted      4. Patient will return to full functional activities without increased symptoms or restriction. [x]? Progressing: []? Met: []? Not Met: []? Adjusted      5. Sleep through the night 100% of time(patient specific functional goal)    [x]? Progressing: []? Met: []? Not Met: []? Adjusted              Overall Progression Towards Functional goals/ Treatment Progress Update:  [x] Patient is progressing as expected towards functional goals listed. [] Progression is slowed due to complexities/Impairments listed. [] Progression has been slowed due to co-morbidities.   [] Plan just implemented, too soon to assess goals progression <30days   [] Goals require adjustment due to lack of progress  [] Patient is not progressing as expected and requires additional follow up with physician  [] Other    Prognosis for POC: [x] Good [] Fair  [] Poor      Patient requires continued skilled intervention: [x] Yes  [] No    Treatment/Activity Tolerance:  [x] Patient able to complete treatment  [] Patient limited by fatigue  [] Patient limited by pain    [] Patient limited by other medical complications  [] Other:       PLAN: See eval  [x] Continue per plan of care [] Alter current plan (see comments above)  [] Plan of care initiated [] Hold pending MD visit [] Discharge    Electronically signed by:  Keyona William PTA,     Note: If patient does not return for scheduled/ recommended follow up visits, this note will serve as a discharge from care

## 2020-07-13 NOTE — PROGRESS NOTES
Surgery: Reverse shoulder replacement    Post-Op Week: 6    Chief Complaint:  Post-Op Check (LEFT RTSA 6/4/2020)      History of Present of Illness: Bal Hinds returns today doing quite well. She has minimal to no pain. In fact her real complaints are her cervical spine and her right shoulder    Review of Systems  Pertinent items are noted in HPI  Denies fever, chills, confusion, bowel/bladder active change. Review of systems reviewed from Patient History Form dated on July 13 and available in the patient's chart under the Media tab. Examination:  Today she is already demonstrating some good independent active control with forward elevation above 90. Extra rotation past 40. Incisions healing well with a trace of Monocryl visible at the inferior extent    Radiology:     None today    No orders of the defined types were placed in this encounter. Impression:  Progressing well      Treatment Plan:  She will advance to the next phase of therapy. Discontinued sling. Discussed some postural improvements that may help her neck. At her next visit we will update films and perhaps consider imaging her other shoulder having an injection as well          [address] [facility] [place]     This dictation was performed with a verbal recognition program (DRAGON) and it was checked for errors. It is possible that there are still dictated errors within this office note. If so, please bring any errors to my attention for an addendum. All efforts were made to ensure that this office note is accurate.

## 2020-07-15 ENCOUNTER — APPOINTMENT (OUTPATIENT)
Dept: PHYSICAL THERAPY | Age: 65
End: 2020-07-15
Payer: MEDICARE

## 2020-07-16 ENCOUNTER — APPOINTMENT (OUTPATIENT)
Dept: PHYSICAL THERAPY | Age: 65
End: 2020-07-16
Payer: MEDICARE

## 2020-07-22 ENCOUNTER — APPOINTMENT (OUTPATIENT)
Dept: PHYSICAL THERAPY | Age: 65
End: 2020-07-22
Payer: MEDICARE

## 2020-07-24 RX ORDER — ROSUVASTATIN CALCIUM 20 MG/1
TABLET, COATED ORAL
Qty: 90 TABLET | Refills: 1 | Status: SHIPPED | OUTPATIENT
Start: 2020-07-24 | End: 2021-01-26

## 2020-07-29 ENCOUNTER — HOSPITAL ENCOUNTER (OUTPATIENT)
Dept: PHYSICAL THERAPY | Age: 65
Setting detail: THERAPIES SERIES
Discharge: HOME | End: 2020-07-29
Payer: MEDICARE

## 2020-07-29 PROCEDURE — 97112 NEUROMUSCULAR REEDUCATION: CPT | Performed by: PHYSICAL THERAPIST

## 2020-07-29 PROCEDURE — 97140 MANUAL THERAPY 1/> REGIONS: CPT | Performed by: PHYSICAL THERAPIST

## 2020-07-29 PROCEDURE — 97110 THERAPEUTIC EXERCISES: CPT | Performed by: PHYSICAL THERAPIST

## 2020-07-29 NOTE — PROGRESS NOTES
scapulothoracic and UE control with self care, reaching, carrying, lifting, house/yardwork, driving/computer work      Manual Treatments:  PROM / STM / Oscillations-Mobs:  G-I, II, III, IV (PA's, Inf., Post.)  [x] (24887) Provided manual therapy to mobilize soft tissue/joints of cervical/CT, scapular GHJ and UE for the purpose of modulating pain, promoting relaxation,  increasing ROM, reducing/eliminating soft tissue swelling/inflammation/restriction, improving soft tissue extensibility and allowing for proper ROM for normal function with self care, reaching, carrying, lifting, house/yardwork, driving/computer work    Modalities:     [] GAME READY (VASO)- for significant edema, swelling, pain control. Charges:  Timed Code Treatment Minutes: 40   Total Treatment Minutes: 40'      [] EVAL (LOW) 67979 (typically 20 minutes face-to-face)  [] EVAL (MOD) 09995 (typically 30 minutes face-to-face)  [] EVAL (HIGH) 44187 (typically 45 minutes face-to-face)  [] RE-EVAL     [x] YT(76537) x2     [] IONTO  [] NMR (71291) x     [] VASO  [x] Manual (45039) x 1    [] Other:  [] TA x      [] Mech Traction (34759)  [] ES(attended) (62227)      [] ES (un) (96222):    ASSESSMENT:  Progressing well with strengthening exercises, demonstrates good understanding of HEP. Increased ROM today with no pain. Lime band to HEP, reviewed HEP. Pt wants to try HEP at home for a while      GOALS:   Patient stated goal: increase motion     Therapist goals for Patient:   Short Term Goals: To be achieved in: 2 weeks  1. Independent in HEP and progression per patient tolerance, in order to prevent re-injury. [x]? Progressing: []? Met: []? Not Met: []? Adjusted      2. Patient will have a decrease in pain to facilitate improvement in movement, function, and ADLs as indicated by Functional Deficits. [x]? Progressing: []? Met: []? Not Met: []? Adjusted      Long Term Goals: To be achieved in: 12 weeks  1.  Disability index score of 42% or less for the DASH to assist with reaching prior level of function. [x]? Progressing: []? Met: []? Not Met: []? Adjusted      2. Patient will demonstrate increased AROM to 120 shldr flex to allow for proper joint functioning as indicated by patients Functional Deficits. [x]? Progressing: []? Met: []? Not Met: []? Adjusted      3. Patient will demonstrate an increase in Strength to 5/5 to allow for proper functional mobility as indicated by patients Functional Deficits. [x]? Progressing: []? Met: []? Not Met: []? Adjusted      4. Patient will return to full functional activities without increased symptoms or restriction. [x]? Progressing: []? Met: []? Not Met: []? Adjusted      5. Sleep through the night 100% of time(patient specific functional goal)    [x]? Progressing: []? Met: []? Not Met: []? Adjusted              Overall Progression Towards Functional goals/ Treatment Progress Update:  [x] Patient is progressing as expected towards functional goals listed. [] Progression is slowed due to complexities/Impairments listed. [] Progression has been slowed due to co-morbidities. [] Plan just implemented, too soon to assess goals progression <30days   [] Goals require adjustment due to lack of progress  [] Patient is not progressing as expected and requires additional follow up with physician  [] Other    Prognosis for POC: [x] Good [] Fair  [] Poor      Patient requires continued skilled intervention: [x] Yes  [] No    Treatment/Activity Tolerance:  [x] Patient able to complete treatment  [] Patient limited by fatigue  [] Patient limited by pain    [] Patient limited by other medical complications  [] Other:       PLAN: Try HEP for a couple weeks.  POC good until 9/15  [x] Continue per plan of care [] Alter current plan (see comments above)  [] Plan of care initiated [] Hold pending MD visit [] Discharge    Electronically signed by:  Akin Huang PT,     Note: If patient does not return for scheduled/ recommended follow up visits, this note will serve as a discharge from care along with most recent update on progress.

## 2020-08-17 ENCOUNTER — OFFICE VISIT (OUTPATIENT)
Dept: ORTHOPEDIC SURGERY | Age: 65
End: 2020-08-17

## 2020-08-17 ENCOUNTER — TELEMEDICINE (OUTPATIENT)
Dept: PAIN MANAGEMENT | Age: 65
End: 2020-08-17
Payer: MEDICARE

## 2020-08-17 VITALS — WEIGHT: 146 LBS | HEIGHT: 61 IN | BODY MASS INDEX: 27.56 KG/M2

## 2020-08-17 PROCEDURE — 1036F TOBACCO NON-USER: CPT | Performed by: ANESTHESIOLOGY

## 2020-08-17 PROCEDURE — 3017F COLORECTAL CA SCREEN DOC REV: CPT | Performed by: ANESTHESIOLOGY

## 2020-08-17 PROCEDURE — G8417 CALC BMI ABV UP PARAM F/U: HCPCS | Performed by: ANESTHESIOLOGY

## 2020-08-17 PROCEDURE — 1123F ACP DISCUSS/DSCN MKR DOCD: CPT | Performed by: ANESTHESIOLOGY

## 2020-08-17 PROCEDURE — G8427 DOCREV CUR MEDS BY ELIG CLIN: HCPCS | Performed by: ANESTHESIOLOGY

## 2020-08-17 PROCEDURE — 99024 POSTOP FOLLOW-UP VISIT: CPT | Performed by: ORTHOPAEDIC SURGERY

## 2020-08-17 PROCEDURE — 4040F PNEUMOC VAC/ADMIN/RCVD: CPT | Performed by: ANESTHESIOLOGY

## 2020-08-17 PROCEDURE — G8399 PT W/DXA RESULTS DOCUMENT: HCPCS | Performed by: ANESTHESIOLOGY

## 2020-08-17 PROCEDURE — 99213 OFFICE O/P EST LOW 20 MIN: CPT | Performed by: ANESTHESIOLOGY

## 2020-08-17 PROCEDURE — 1090F PRES/ABSN URINE INCON ASSESS: CPT | Performed by: ANESTHESIOLOGY

## 2020-08-17 RX ORDER — GABAPENTIN 400 MG/1
400 CAPSULE ORAL 3 TIMES DAILY
Qty: 270 CAPSULE | Refills: 0 | Status: SHIPPED | OUTPATIENT
Start: 2020-08-17 | End: 2020-12-03 | Stop reason: SDUPTHER

## 2020-08-17 RX ORDER — TIZANIDINE 4 MG/1
4 TABLET ORAL 2 TIMES DAILY PRN
Qty: 120 TABLET | Refills: 0 | Status: SHIPPED | OUTPATIENT
Start: 2020-08-17

## 2020-08-17 RX ORDER — MELOXICAM 15 MG/1
15 TABLET ORAL DAILY
Qty: 90 TABLET | Refills: 0 | Status: SHIPPED | OUTPATIENT
Start: 2020-08-17 | End: 2020-12-03 | Stop reason: SDUPTHER

## 2020-08-17 NOTE — PROGRESS NOTES
[address] [facility] [address], [place], [address]  [phone] (Y)  [phone] (f)    8/17/20      Leilani Plascencia is a 76 y.o. female evaluated via VIRTUAL VIDEO/AUDIO on 8/17/2020 using HIPPA compliant hardware/software (Epic/Haiku). Consent:  She and/or health care decision maker is aware that that she may receive a bill for this virtual video service, depending on her insurance coverage, and has provided verbal consent to proceed: Yes. Due to nature of the virtual visit, evaluation of organ systems was limited to presenting complaints. Chief Complaint   Patient presents with    Joint Pain     virtual visit due to COVID19 lock down       Reason for call: joint and back pain     Any changes since last visit:   Pain constant in multiple joints, camryn shoulders, knees and lower back; achy, sharp without radiation or weakness. Pain worse with prolonged activities standing walking and changes in weather and helped by medications. Reports she underwent left shoulder replacement surgery in 06/2020. Past Medical History:   Diagnosis Date    Cancer ([address]     basal cell on left upper chest, SQUAMOUS L FOREARM    Hemangioma     T10    Hyperlipidemia     Hypertension     Osteoarthritis     PONV (postoperative nausea and vomiting)     Prolonged emergence from general anesthesia     POSSIBLY    Shingles     Severe 1985 and 1989 back and right hip, right leg.        Past Surgical History:   Procedure Laterality Date    BACK SURGERY      BREAST SURGERY Right     cyst right    ENDOMETRIAL ABLATION      uterine    FOOT SURGERY      neuroma left foot    JOINT REPLACEMENT Bilateral 2017    RIGHT MARCH/ LEFT JULY; hips    KNEE ARTHROSCOPY      right    SHOULDER SURGERY Right     rotator cuff, BICEP    SHOULDER SURGERY Left 6/4/2020    LEFT REVERSE TOTAL SHOULDER ARTHROPLASTY WITH INTERSCALENE BLOCK FOR PAIN CONTROL          [facility] Vaughn Villarreal [facility]at [facility]    left anterior upper chest    SKIN CANCER EXCISION  2-2016    mid chest area   [facility] SURGERY  01/27/2015    lumbar laminectomy, Dr. Vito Vazquez  04/1998    cervical     TUMOR REMOVAL      left rib       Allergies   Allergen Reactions    Lipitor      Muscle aches    Lisinopril Other (See Comments)     cough    Lovenox [Enoxaparin Sodium] Other (See Comments)     Injection reaction- hematoma resulting in bowel blockage    Tramadol      Drug interaction w/mobic/fluoxetine    Adhesive Tape Rash       Current Outpatient Medications   Medication Sig Dispense Refill    gabapentin (NEURONTIN) 400 MG capsule Take 1 capsule by mouth 3 times daily for 90 days. 270 capsule 0    tiZANidine (ZANAFLEX) 4 MG tablet Take 1 tablet by mouth 2 times daily as needed (muscle spasm and chronic pain) 120 tablet 0    meloxicam (MOBIC) 15 MG tablet Take 1 tablet by mouth daily (with food) 3- month supply 90 tablet 0    rosuvastatin (CRESTOR) 20 MG tablet TAKE 1 TABLET BY MOUTH EVERY DAY EVERY NIGHT 90 tablet 1    Loratadine (CLARITIN PO) Take by mouth      amLODIPine (NORVASC) 5 MG tablet Take 1 tablet by mouth daily 30 tablet 5    CALCIUM/MAGNESIUM/ZINC FORMULA PO Take by mouth nightly      Turmeric 400 MG CAPS Take by mouth daily      mupirocin (BACTROBAN) 2 % ointment Apply small amount to each nostril twice daily starting 5 days prior to surgery. See surgery instructions for more information. 1 Tube 0    benzoyl peroxide (BENZOYL PEROXIDE) 5 % external liquid Wash neck, shoulder and armpit. See surgery instructions for more information.  1 Bottle 0    Coenzyme Q10 (CO Q 10) 100 MG CAPS Take by mouth daily       FLUoxetine (PROZAC) 10 MG tablet Take 1 tablet by mouth daily 90 tablet 2    aspirin 81 MG tablet Take 81 mg by mouth daily      Glucosa-Chondr-Na Chondr--[id number] MG TABS Take by mouth Indications: 2 tablets daily       patient's (and/or legal guardian's) consent, to reduce the patient's risk of exposure to COVID-19 and provide necessary medical care. The patient (and/or legal guardian) has also been advised to contact this office for worsening conditions or problems, and seek emergency medical treatment and/or call [phone] if deemed necessary.

## 2020-08-20 NOTE — PROGRESS NOTES
Surgery: Shoulder arthroplasty    Post-Op Week:  2    Chief Complaint:  Follow-up (LEFT RTSA 6/4/2020) and Pain (RIGHT SHOULDER)      History of Present of Illness: Anjelica reports doing quite well. His pain is already much better than before surgery. No complications to report. She like to investigate her right shoulder as well. Review of Systems  Pertinent items are noted in HPI  Denies fever, chills, confusion, bowel/bladder active change. Review of systems reviewed from Patient History Form dated on August 17 and available in the patient's chart under the Media tab. Examination:  On exam the left shoulder is a healing well. Well-healed pectoral incision. Axillary nerve intact. Tolerates gentle pendulums as well as good active control the elbow wrist hand. She has significant crepitation about the left shoulder. Movement 110 degrees, 90 degrees, 20 degrees, lumbosacral.  Strength 4/5. Radiology:     X-rays obtained reviewed in the office today include 2 views of the left shoulder. She has a well aligned implants. No evidence of loosening complication or periprosthetic fracture    Additional films of the right shoulder include 2 views of the right shoulder. She has severe osteoarthritic change. End-stage bone-on-bone. Erosive disease with sclerosis and marginal osteophytes    Orders Placed This Encounter   Procedures    XR SHOULDER RIGHT (MIN 2 VIEWS)    XR SHOULDER LEFT (MIN 2 VIEWS)       Impression:  Well-functioning shoulder arthroplasty      Treatment Plan:  We discussed her quite severe shoulder arthritis. We discussed the timing of surgery and the rehabilitation process. I think she looks appropriate to continue advancing through her rehabilitation protocol. We discussed general health and wellness. Appropriate restrictions. Like to see her back in a month.           [address] [facility]     This dictation was performed with a verbal recognition program (DRAGON) and it was checked for errors. It is possible that there are still dictated errors within this office note. If so, please bring any errors to my attention for an addendum. All efforts were made to ensure that this office note is accurate.

## 2020-08-31 ENCOUNTER — OFFICE VISIT (OUTPATIENT)
Dept: ORTHOPEDIC SURGERY | Age: 65
End: 2020-08-31
Payer: MEDICARE

## 2020-08-31 VITALS — HEIGHT: 61 IN | BODY MASS INDEX: 27.56 KG/M2 | WEIGHT: 146 LBS

## 2020-08-31 PROCEDURE — G8399 PT W/DXA RESULTS DOCUMENT: HCPCS | Performed by: PHYSICIAN ASSISTANT

## 2020-08-31 PROCEDURE — 1123F ACP DISCUSS/DSCN MKR DOCD: CPT | Performed by: PHYSICIAN ASSISTANT

## 2020-08-31 PROCEDURE — 1036F TOBACCO NON-USER: CPT | Performed by: PHYSICIAN ASSISTANT

## 2020-08-31 PROCEDURE — 4040F PNEUMOC VAC/ADMIN/RCVD: CPT | Performed by: PHYSICIAN ASSISTANT

## 2020-08-31 PROCEDURE — 99213 OFFICE O/P EST LOW 20 MIN: CPT | Performed by: PHYSICIAN ASSISTANT

## 2020-08-31 PROCEDURE — G8417 CALC BMI ABV UP PARAM F/U: HCPCS | Performed by: PHYSICIAN ASSISTANT

## 2020-08-31 PROCEDURE — 1090F PRES/ABSN URINE INCON ASSESS: CPT | Performed by: PHYSICIAN ASSISTANT

## 2020-08-31 PROCEDURE — G8427 DOCREV CUR MEDS BY ELIG CLIN: HCPCS | Performed by: PHYSICIAN ASSISTANT

## 2020-08-31 PROCEDURE — 3017F COLORECTAL CA SCREEN DOC REV: CPT | Performed by: PHYSICIAN ASSISTANT

## 2020-08-31 NOTE — PROGRESS NOTES
at night)  Limiting Behavior: Some  Relieving Factors: Rest  Result of Injury: No    Medical History:  Patient's medications, allergies, past medical, surgical, social and family histories were reviewed and updated as appropriate. Review of Systems:  Pertinent items are noted in HPI  Review of systems reviewed from Patient History Form dated on 8/30/2020 and available in the patient's chart under the Media tab. Vital Signs:  Ht 5' 1\" (1.549 m)   Wt 146 lb (66.2 kg)   BMI 27.59 kg/m²     General Exam:   Constitutional: Patient is adequately groomed with no evidence of malnutrition  Mental Status: The patient is oriented to time, place and person. The patient's mood and affect are appropriate. Vascular: Examination reveals no swelling or calf tenderness. Peripheral pulses are palpable and 2+. Neurological: The patient has good coordination. There is no weakness or sensory deficit. Left knee examination:    Inspection: Today's inspection of the left knee reveals the skin to be intact with no present joint effusion. Palpation: She is diffusely tender to palpation over the anterior medial aspect of the left knee and there is a palpable plica noted which is painful. He has very little medial or lateral joint line pain and there is crepitus noted over the anterior compartment with range of motion. Range of Motion: 0 degrees of extension to at least 130 degrees of flexion on the left    Strength: -5/5 in her quadriceps and hamstrings on the left    Special Tests: Negative Lockman negative Lexie negative pivot shift    Skin: There are no rashes, ulcerations or lesions. Gait: Without an assistive device or limp            Additional Examinations:         Right Lower Extremity: Examination of the right lower extremity does not show any tenderness, deformity or injury. Range of motion is unremarkable and there is no pain with range of motion. There are no rashes, ulcerations or lesions. Strength and tone are normal.  Left Lower Extremity: Examination of the left lower extremity does not show any tenderness, deformity or injury. Range of motion is unremarkable and there is no pain with range of motion. There are no rashes, ulcerations or lesions. Strength and tone are normal.  She is tender to palpation over the left trochanteric bursa  Radiology:     X-rays obtained and reviewed in office:  Views 4 views of the left knee to include AP, lateral, sunrise view, tunnel view   Location left knee  Impression there is mild to moderate osteoarthritis noted within the medial compartment of the left knee and minimally within the patellofemoral joint. There is no patellar tilt or subluxation noted. 1 view of the pelvis reveals left and right total hip arthroplasties that are in excellent position with no progressive radiolucencies noted. There is no soft tissue swelling or mass noted bilaterally. Assessment : Stable left and right total hip arthroplasties which are now 3 years postop  Left knee pain with medial plica    Impression:  Encounter Diagnoses   Name Primary?  Left knee pain, unspecified chronicity     Hip pain, left     Plica syndrome, left knee Yes    History of total left hip replacement        Office Procedures:  Orders Placed This Encounter   Procedures    XR KNEE LEFT (MIN 4 VIEWS)     Standing Status:   Future     Number of Occurrences:   1     Standing Expiration Date:   8/31/2021    XR PELVIS (1-2 VIEWS)     Standing Status:   Future     Number of Occurrences:   1     Standing Expiration Date:   8/31/2021       Treatment Plan: Today we have discussed the diagnosis and treatment options. We are going to continue observation of her hips and if she has continued pain within either hip we will schedule a [place] MRI to rule out metal-on-metal debris.   If the patient continues to experience pain over the medial aspect of the left knee after 1 month of home exercise routine to include isometrics and her elliptical  she will return for cortisone injection into the knee. We also discussed potential need for arthroscopy and plica resection. Educational material was provided for her review and exercise routine was provided. She will follow-up in 1 month as needed.

## 2020-09-14 RX ORDER — AMLODIPINE BESYLATE 5 MG/1
TABLET ORAL
Qty: 90 TABLET | Refills: 1 | Status: SHIPPED | OUTPATIENT
Start: 2020-09-14

## 2020-09-15 ENCOUNTER — TELEPHONE (OUTPATIENT)
Dept: FAMILY MEDICINE CLINIC | Age: 65
End: 2020-09-15

## 2020-09-15 NOTE — TELEPHONE ENCOUNTER
Wash w/ cool water and soap, rinse, pat dry, use polysporin cream and cover w/ bandage twice daily. If blister is intact leave it alone till busts on own.

## 2020-09-15 NOTE — TELEPHONE ENCOUNTER
TORI  --  Patient burnt her finger on a glue gun last night and now it is blistering. She was asking if she should be taking an antibiotic since she just had a shoulder replacement. I advised to call the surgeon who did the surgery and ask if it is recommended. She will call.

## 2020-10-02 ENCOUNTER — OFFICE VISIT (OUTPATIENT)
Dept: FAMILY MEDICINE CLINIC | Age: 65
End: 2020-10-02
Payer: MEDICARE

## 2020-10-02 VITALS
OXYGEN SATURATION: 99 % | HEART RATE: 78 BPM | WEIGHT: 150.2 LBS | HEIGHT: 61 IN | BODY MASS INDEX: 28.36 KG/M2 | SYSTOLIC BLOOD PRESSURE: 132 MMHG | RESPIRATION RATE: 18 BRPM | TEMPERATURE: 98.1 F | DIASTOLIC BLOOD PRESSURE: 72 MMHG

## 2020-10-02 PROCEDURE — 4040F PNEUMOC VAC/ADMIN/RCVD: CPT | Performed by: INTERNAL MEDICINE

## 2020-10-02 PROCEDURE — G0439 PPPS, SUBSEQ VISIT: HCPCS | Performed by: INTERNAL MEDICINE

## 2020-10-02 PROCEDURE — 1123F ACP DISCUSS/DSCN MKR DOCD: CPT | Performed by: INTERNAL MEDICINE

## 2020-10-02 PROCEDURE — 90694 VACC AIIV4 NO PRSRV 0.5ML IM: CPT | Performed by: INTERNAL MEDICINE

## 2020-10-02 PROCEDURE — G0008 ADMIN INFLUENZA VIRUS VAC: HCPCS | Performed by: INTERNAL MEDICINE

## 2020-10-02 PROCEDURE — G8484 FLU IMMUNIZE NO ADMIN: HCPCS | Performed by: INTERNAL MEDICINE

## 2020-10-02 PROCEDURE — 3017F COLORECTAL CA SCREEN DOC REV: CPT | Performed by: INTERNAL MEDICINE

## 2020-10-02 PROCEDURE — 99214 OFFICE O/P EST MOD 30 MIN: CPT | Performed by: INTERNAL MEDICINE

## 2020-10-02 ASSESSMENT — ENCOUNTER SYMPTOMS
BACK PAIN: 1
RESPIRATORY NEGATIVE: 1
EYES NEGATIVE: 1
GASTROINTESTINAL NEGATIVE: 1

## 2020-10-02 ASSESSMENT — LIFESTYLE VARIABLES
HOW MANY STANDARD DRINKS CONTAINING ALCOHOL DO YOU HAVE ON A TYPICAL DAY: 0
AUDIT-C TOTAL SCORE: 1
HOW OFTEN DO YOU HAVE SIX OR MORE DRINKS ON ONE OCCASION: 0
HOW OFTEN DO YOU HAVE A DRINK CONTAINING ALCOHOL: 1

## 2020-10-02 ASSESSMENT — PATIENT HEALTH QUESTIONNAIRE - PHQ9
1. LITTLE INTEREST OR PLEASURE IN DOING THINGS: 0
SUM OF ALL RESPONSES TO PHQ9 QUESTIONS 1 & 2: 0
SUM OF ALL RESPONSES TO PHQ QUESTIONS 1-9: 0
SUM OF ALL RESPONSES TO PHQ QUESTIONS 1-9: 0
2. FEELING DOWN, DEPRESSED OR HOPELESS: 0

## 2020-10-02 NOTE — PROGRESS NOTES
10/2/2020    Mariah Lamb (:  1955) is a 76 y.o. female, here for evaluation of the following medical concerns:    HPI  Medicare annual wellness visit, subsequent  Mammo: 2019. Pap: gyn appt in 2 weeks  Colonoscopy: referral given  DEXA:   Eye: 2020  Hearing: passed in office exam.  Immunnization: Flu shot today. MMSE: na  Get Up and Go: na  Tob: nonsmoker/never  ETOH: 3 glasses /week  Caffeine: moderate  Cardiac Risk Assessment:   Living will:  yes  Medical power of : yes    Mixed hyperlipidemia  Lost 40 lbs and was off meds but labs were high. Restarted med but changed to Crestor. Doing ok. Still active. Essential hypertension  No change in meds(Lisinopril ), no c/o with meds, no chest pain, SOB, palpatations, or syncope. Home bp was good. Spinal stenosis of lumbar region with neurogenic claudication  3 back surgeries. Saw Pain management which has released her since not on opiods. Now off most meds w/ core stretches. Still on Gabapentin, Flexeril, and Meloxicam. Still w/ some pain w/ activity    Polyarthropathy, multiple sites  Diffuse pain multiple joins. Review of Systems   Constitutional: Positive for fatigue. HENT: Negative. Eyes: Negative. Respiratory: Negative. Cardiovascular: Negative. Gastrointestinal: Negative. Endocrine: Negative. Genitourinary: Negative. Musculoskeletal: Positive for arthralgias, back pain, gait problem and myalgias. Skin: Negative. Neurological: Positive for weakness. Psychiatric/Behavioral: Positive for sleep disturbance. Prior to Visit Medications    Medication Sig Taking? Authorizing Provider   amLODIPine (NORVASC) 5 MG tablet TAKE 1 TABLET BY MOUTH EVERY DAY Yes C Valentino Lords, MD   gabapentin (NEURONTIN) 400 MG capsule Take 1 capsule by mouth 3 times daily for 90 days.  Yes Chrissy Johnson MD   tiZANidine (ZANAFLEX) 4 MG tablet Take 1 tablet by mouth 2 times daily as needed (muscle spasm and LEFT REVERSE TOTAL SHOULDER ARTHROPLASTY WITH INTERSCALENE BLOCK FOR PAIN CONTROL          [facility] by Ricki Millan [facility] at [facility]  October 2015    left anterior upper chest    SKIN CANCER EXCISION  2-2016    mid chest area   [facility] SPINE SURGERY  01/27/2015    lumbar laminectomy, Dr. Fidelia Branch  04/1998    cervical     TUMOR REMOVAL      left rib       Social History     Socioeconomic History    Marital status:      Spouse name: Not on file    Number of children: Not on file    Years of education: Not on file    Highest education level: Not on file   Occupational History    Occupation: Disabled      Comment: 3/2016   Social Needs    Financial resource strain: Not on file    Food insecurity     Worry: Not on file     Inability: Not on file   [employer] needs     Medical: Not on file     Non-medical: Not on file   Tobacco Use    Smoking status: Never Smoker    Smokeless tobacco: Never Used   Substance and Sexual Activity    Alcohol use:  Yes     Alcohol/week: 0.0 standard drinks     Comment: occasional glass wine    Drug use: No    Sexual activity: Yes     Partners: Male   Lifestyle    Physical activity     Days per week: Not on file     Minutes per session: Not on file    Stress: Not on file   Relationships    Social connections     Talks on phone: Not on file     Gets together: Not on file     Attends Jewish service: Not on file     Active member of club or organization: Not on file     Attends meetings of clubs or organizations: Not on file     Relationship status: Not on file    Intimate partner violence     Fear of current or ex partner: Not on file     Emotionally abused: Not on file     Physically abused: Not on file     Forced sexual activity: Not on file   Other Topics Concern    Not on file   Social History Narrative    Not on file        Family History   Problem Relation Age of Onset    Heart Disease Mother     Cancer Mother         cervix and parathyroid    Arthritis Mother     Heart Disease Father     Cancer Father         lung and brain mets    Cancer Sister         skin ca - SCC    Arthritis Sister     Cancer Sister         skin ca - NMSC    Cancer Sister         skin ca- NMSC    Arthritis Brother      Vitals:    10/02/20 1025 10/02/20 1054   BP: 122/80 132/72   Pulse: 78    Resp: 18    Temp: 98.1 °F (36.7 °C)    SpO2: 99%    Weight: 150 lb 3.2 oz (68.1 kg)    Height: 5' 1.4\" (1.56 m)        Estimated body mass index is 28.01 kg/m² as calculated from the following:    Height as of this encounter: 5' 1.4\" (1.56 m). Weight as of this encounter: 150 lb 3.2 oz (68.1 kg). Physical Exam  Constitutional:       General: She is not in acute distress. Appearance: Normal appearance. She is well-developed. She is not ill-appearing, toxic-appearing or diaphoretic. HENT:      Head: Normocephalic and atraumatic. Right Ear: Hearing, tympanic membrane, ear canal and external ear normal. There is no impacted cerumen. Left Ear: Hearing, tympanic membrane, ear canal and external ear normal. There is no impacted cerumen. Nose: Nose normal. No congestion or rhinorrhea. Right Sinus: No maxillary sinus tenderness or frontal sinus tenderness. Left Sinus: No maxillary sinus tenderness or frontal sinus tenderness. Mouth/Throat:      Mouth: Mucous membranes are moist.      Pharynx: Oropharynx is clear. Uvula midline. No oropharyngeal exudate or posterior oropharyngeal erythema. Eyes:      General: Lids are normal.      Extraocular Movements: Extraocular movements intact. Conjunctiva/sclera: Conjunctivae normal.      Pupils: Pupils are equal, round, and reactive to light. Funduscopic exam:     Right eye: No hemorrhage, exudate, AV nicking, arteriolar narrowing or papilledema. Left eye: No hemorrhage, exudate, AV nicking, arteriolar narrowing or papilledema.    Neck: Musculoskeletal: Full passive range of motion without pain, normal range of motion and neck supple. No neck rigidity or muscular tenderness. Thyroid: No thyromegaly. Vascular: Normal carotid pulses. No carotid bruit, hepatojugular reflux or JVD. Trachea: Trachea and phonation normal. No tracheal deviation. Cardiovascular:      Rate and Rhythm: Regular rhythm. Bradycardia present. No extrasystoles are present. Chest Wall: PMI is not displaced. Pulses: Normal pulses. No decreased pulses. Carotid pulses are 2+ on the right side and 2+ on the left side. Radial pulses are 2+ on the right side and 2+ on the left side. Femoral pulses are 2+ on the right side and 2+ on the left side. Popliteal pulses are 2+ on the right side and 2+ on the left side. Dorsalis pedis pulses are 2+ on the right side and 2+ on the left side. Posterior tibial pulses are 2+ on the right side and 2+ on the left side. Heart sounds: Normal heart sounds. No murmur. No friction rub. No gallop. Pulmonary:      Effort: Pulmonary effort is normal. No respiratory distress. Breath sounds: Normal breath sounds. No stridor. No decreased breath sounds, wheezing, rhonchi or rales. Chest:      Chest wall: No tenderness. Abdominal:      General: Bowel sounds are normal. There is no distension or abdominal bruit. Palpations: Abdomen is soft. There is no shifting dullness, fluid wave, mass or pulsatile mass. Tenderness: There is no abdominal tenderness. There is no right CVA tenderness, left CVA tenderness, guarding or rebound. Hernia: No hernia is present. There is no hernia in the ventral area. Genitourinary:     Exam position: Supine. Musculoskeletal: Normal range of motion. General: Tenderness (diffuse OA neck, Hips, knees, lower back. S/p L shoulder surgery. ) present. No swelling, deformity or signs of injury. Right lower leg: No edema. Left lower leg: No edema. Lymphadenopathy:      Head:      Right side of head: No submental, submandibular, tonsillar, preauricular, posterior auricular or occipital adenopathy. Left side of head: No submental, submandibular, tonsillar, preauricular, posterior auricular or occipital adenopathy. Cervical: No cervical adenopathy. Upper Body:      Right upper body: No supraclavicular or epitrochlear adenopathy. Left upper body: No supraclavicular or epitrochlear adenopathy. Skin:     General: Skin is warm and dry. Capillary Refill: Capillary refill takes less than 2 seconds. Coloration: Skin is not jaundiced or pale. Findings: No abrasion, bruising, erythema, lesion or rash. Nails: There is no clubbing. Neurological:      General: No focal deficit present. Mental Status: She is alert and oriented to person, place, and time. Mental status is at baseline. She is not disoriented. Cranial Nerves: No cranial nerve deficit. Sensory: No sensory deficit. Motor: No weakness, tremor, atrophy, abnormal muscle tone or seizure activity. Coordination: Coordination normal.      Gait: Gait normal.      Deep Tendon Reflexes: Reflexes are normal and symmetric. Reflexes normal. Babinski sign absent on the right side. Babinski sign absent on the left side. Reflex Scores:       Tricep reflexes are 2+ on the right side and 2+ on the left side. Bicep reflexes are 2+ on the right side and 2+ on the left side. Brachioradialis reflexes are 2+ on the right side and 2+ on the left side. Patellar reflexes are 2+ on the right side and 2+ on the left side. Achilles reflexes are 2+ on the right side and 2+ on the left side. Psychiatric:         Mood and Affect: Mood normal.         Speech: Speech normal.         Behavior: Behavior normal.         Thought Content: Thought content normal.         Judgment: Judgment normal.         ASSESSMENT/PLAN:  1. Medicare annual wellness visit, subsequent  Flu shot today, referral for Colonoscopy    2. Mixed hyperlipidemia  Will do labs and adjust med if needed. To continue to improve diet and lose some weight. 3. Essential hypertension  Continue present meds. Home BP checks. Call if>140/90. Improve diet. Avoid caffeine and salt. Call if new c/o w/ meds. 4. Spinal stenosis of lumbar region with neurogenic claudication  To Spinal MD if reoccurs. Continue present meds for now(Neurontin, Zanaflex, and Meloxicam)    5. Polyarthropathy, multiple sites  To Ortho if new c/o. Continue meds for now. increase exercise as tolerated,    RTSM 6 mo    An  electronic signature was used to authenticate this note. --William Robins MD on 10/2/2020 at 10:48 AM  Medicare Annual Wellness Visit  Name: Anton Soto Date: 10/2/2020   MRN: <[id number]> Sex: Female   Age: 76 y.o. Ethnicity: Non-/Non    : 1955 Race: Thad Lamb is here for Medicare AWV    Screenings for behavioral, psychosocial and functional/safety risks, and cognitive dysfunction are all negative except as indicated below. These results, as well as other patient data from the [address] form, are documented in Flowsheets linked to this Encounter. Allergies   Allergen Reactions    Lipitor      Muscle aches    Lisinopril Other (See Comments)     cough    Lovenox [Enoxaparin Sodium] Other (See Comments)     Injection reaction- hematoma resulting in bowel blockage    Tramadol      Drug interaction w/mobic/fluoxetine    Adhesive Tape Rash       Prior to Visit Medications    Medication Sig Taking? Authorizing Provider   amLODIPine (NORVASC) 5 MG tablet TAKE 1 TABLET BY MOUTH EVERY DAY Yes SAFIA Betancourt MD   gabapentin (NEURONTIN) 400 MG capsule Take 1 capsule by mouth 3 times daily for 90 days.  Yes Rose Rojas MD   tiZANidine (ZANAFLEX) 4 MG tablet Take 1 tablet by mouth 2 times daily as needed (muscle Isolation, Stress or Anger?: (!) Social Isolation, Anger  Do you get the social and emotional support that you need?: Yes  Do you have a Living Will?: Yes  General Health Risk Interventions:  · no referral required at present time    Hearing/Vision:  No exam data present  Hearing/Vision  Do you or your family notice any trouble with your hearing?: (!) Yes  Do you have difficulty driving, watching TV, or doing any of your daily activities because of your eyesight?: No  Have you had an eye exam within the past year?: Yes  Hearing/Vision Interventions:  · up to date. Safety:  Safety  Do you have working smoke detectors?: Yes  Have all throw rugs been removed or fastened?: (!) No  Do you have non-slip mats or surfaces in all bathtubs/showers?: Yes  Do all of your stairways have a railing or banister?: Yes  Are your doorways, halls and stairs free of clutter?: Yes  Do you always fasten your seatbelt when you are in a car?: Yes  Safety Interventions:  · Patient declines any further evaluation/treatment for this issue    ADL:  ADLs  In the past 7 days, did you need help from others to perform any of the following everyday activities? Eating, dressing, grooming, bathing, toileting, or walking/balance?: None  In the past 7 days, did you need help from others to take care of any of the following?  Laundry, housekeeping, banking/finances, shopping, telephone use, food preparation, transportation, or taking medications?: (!) Laundry, Housekeeping  ADL Interventions:  · Patient declines any further evaluation/treatment for this issue    Personalized Preventive Plan   Current Health Maintenance Status  Immunization History   Administered Date(s) Administered    Influenza 10/08/2013    Influenza Vaccine, unspecified formulation 10/08/2013    Influenza Virus Vaccine 10/20/2014, 12/02/2015, 09/18/2017    Influenza, Quadv, IM, (6 mo and older Fluzone, Flulaval, Fluarix and 3 yrs and older Afluria) 09/29/2016    Influenza, Quadv, IM, PF (6 mo and older Fluzone, Flulaval, Fluarix, and 3 yrs and older Afluria) 09/18/2017, 09/27/2018    Influenza, Triv, inactivated, subunit, adjuvanted, IM (Fluad 65 yrs and older) 09/30/2019    Pneumococcal Conjugate 13-valent ([id number]) 09/27/2018, 10/17/2019    Pneumococcal Polysaccharide ([id number]) 09/29/2016    Td, unspecified formulation 10/01/2003    Tdap (Boostrix, Adacel) 10/18/2019    Zoster Recombinant (Shingrix) 06/26/2019, 10/02/2019        Health Maintenance   Topic Date Due    Hepatitis C screen  1955    Colon cancer screen colonoscopy  03/10/2004    Annual Wellness Visit (AWV)  06/19/2019    Flu vaccine (1) 09/01/2020    Lipid screen  11/26/2020    A1C test (Diabetic or Prediabetic)  05/29/2021    Potassium monitoring  05/29/2021    Creatinine monitoring  05/29/2021    Statin Therapy  07/24/2021    Pneumococcal 65+ years Vaccine (2 of 2 - PPSV23) 09/29/2021    Breast cancer screen  11/21/2021    DTaP/Tdap/Td vaccine (2 - Td) 10/18/2029    DEXA (modify frequency per FRAX score)  Completed    Shingles Vaccine  Completed    Hepatitis A vaccine  Aged Out    Hepatitis B vaccine  Aged Out    Hib vaccine  Aged Out    Meningococcal (ACWY) vaccine  Aged Out     Recommendations for [employer] Due: see orders and patient instructions/AVS.  . Recommended screening schedule for the next 5-10 years is provided to the patient in written form: see Patient Cleopatra Colin was seen today for medicare awv.     Diagnoses and all orders for this visit:    Medicare annual wellness visit, subsequent    Mixed hyperlipidemia    Essential hypertension    Spinal stenosis of lumbar region with neurogenic claudication    Polyarthropathy, multiple sites

## 2020-10-02 NOTE — ASSESSMENT & PLAN NOTE
Mammo: 11/2019. Pap: gyn appt in 2 weeks  Colonoscopy: referral given  DEXA: 2018  Eye: 4/2020  Hearing: passed in office exam.  Immunnization: Flu shot today.   MMSE: na  Get Up and Go: na  Tob: nonsmoker/never  ETOH: 3 glasses /week  Caffeine: moderate  Cardiac Risk Assessment:   Living will:  yes  Medical power of : yes

## 2020-10-02 NOTE — PATIENT INSTRUCTIONS
ASSESSMENT/PLAN:  1. Medicare annual wellness visit, subsequent  Flu shot today, referral for Colonoscopy    2. Mixed hyperlipidemia  Will do labs and adjust med if needed. To continue to improve diet and lose some weight. 3. Essential hypertension  Continue present meds. Home BP checks. Call if>140/90. Improve diet. Avoid caffeine and salt. Call if new c/o w/ meds. 4. Spinal stenosis of lumbar region with neurogenic claudication  To Spinal MD if reoccurs. Continue present meds for now(Neurontin, Zanaflex, and Meloxicam)    5. Polyarthropathy, multiple sites  To Ortho if new c/o. Continue meds for now. increase exercise as tolerated,    RTSM 6 mo    An  electronic signature was used to authenticate this note. --Bebe Lawson MD on 10/2/2020 at 10:48 AM          Mammo: 11/2019. Pap: gyn appt in 2 weeks  Colonoscopy: referral given  DEXA: 2018  Eye: 4/2020  Hearing: passed in office exam.  Immunnization: Flu shot today. MMSE: na  Get Up and Go: na  Tob: nonsmoker/never  ETOH: 3 glasses /week  Caffeine: moderate  Cardiac Risk Assessment:   Living will:  yes  Medical power of : yes      Personalized Preventive Plan for Jason Jones - 10/2/2020  Medicare offers a range of preventive health benefits. Some of the tests and screenings are paid in full while other may be subject to a deductible, co-insurance, and/or copay. Some of these benefits include a comprehensive review of your medical history including lifestyle, illnesses that may run in your family, and various assessments and screenings as appropriate. After reviewing your medical record and screening and assessments performed today your provider may have ordered immunizations, labs, imaging, and/or referrals for you. A list of these orders (if applicable) as well as your Preventive Care list are included within your After Visit Summary for your review.     Other Preventive Recommendations:    · A preventive eye exam performed by an

## 2020-10-02 NOTE — PROGRESS NOTES
Vaccine Information Sheet, \"Influenza - Inactivated\"  given to Demetria Moeller, or parent/legal guardian of  Demetria Moeller and verbalized understanding. Patient responses:    Have you ever had a reaction to a flu vaccine? No  Do you have any current illness? No  Have you ever had Guillian O'Brien Syndrome? No  Do you have a serious allergy to any of the follow: Neomycin, Polymyxin, Thimerosal, eggs or egg products? No    Flu vaccine given per order. Please see immunization tab. Risks and benefits explained. Current VIS given.

## 2020-10-02 NOTE — ASSESSMENT & PLAN NOTE
No change in meds(Lisinopril ), no c/o with meds, no chest pain, SOB, palpatations, or syncope. Home bp was good.

## 2020-10-02 NOTE — ASSESSMENT & PLAN NOTE
3 back surgeries. Sees Pain management. Now off most meds w/ core stretches.  Still on Gabapentin, Flexeril, and Meloxicam. Still w/ some pain w/ activity

## 2020-10-07 ENCOUNTER — HOSPITAL ENCOUNTER (OUTPATIENT)
Age: 65
Discharge: HOME | End: 2020-10-07
Payer: MEDICARE

## 2020-10-07 LAB
ALBUMIN SERPL-MCNC: 4.4 G/DL (ref 3.4–5)
ALP BLD-CCNC: 76 U/L (ref 40–129)
ALT SERPL-CCNC: 25 U/L (ref 10–40)
ANION GAP SERPL CALCULATED.3IONS-SCNC: 12 MMOL/L (ref 3–16)
AST SERPL-CCNC: 35 U/L (ref 15–37)
BILIRUB SERPL-MCNC: <0.2 MG/DL (ref 0–1)
BILIRUBIN DIRECT: <0.2 MG/DL (ref 0–0.3)
BILIRUBIN, INDIRECT: NORMAL MG/DL (ref 0–1)
BUN BLDV-MCNC: 19 MG/DL (ref 7–20)
CALCIUM SERPL-MCNC: 9.8 MG/DL (ref 8.3–10.6)
CHLORIDE BLD-SCNC: 104 MMOL/L (ref 99–110)
CHOLESTEROL, TOTAL: 198 MG/DL (ref 0–199)
CO2: 27 MMOL/L (ref 21–32)
CREAT SERPL-MCNC: 0.7 MG/DL (ref 0.6–1.2)
GFR AFRICAN AMERICAN: >60
GFR NON-AFRICAN AMERICAN: >60
GLUCOSE BLD-MCNC: 80 MG/DL (ref 70–99)
HCT VFR BLD CALC: 40.7 % (ref 36–48)
HDLC SERPL-MCNC: 67 MG/DL (ref 40–60)
HEMOGLOBIN: 13.6 G/DL (ref 12–16)
LDL CHOLESTEROL CALCULATED: 115 MG/DL
MCH RBC QN AUTO: 29.6 PG (ref 26–34)
MCHC RBC AUTO-ENTMCNC: 33.5 G/DL (ref 31–36)
MCV RBC AUTO: 88.3 FL (ref 80–100)
PDW BLD-RTO: 13.5 % (ref 12.4–15.4)
PHOSPHORUS: 4 MG/DL (ref 2.5–4.9)
PLATELET # BLD: 285 K/UL (ref 135–450)
PMV BLD AUTO: 8.9 FL (ref 5–10.5)
POTASSIUM SERPL-SCNC: 5.2 MMOL/L (ref 3.5–5.1)
RBC # BLD: 4.61 M/UL (ref 4–5.2)
SODIUM BLD-SCNC: 143 MMOL/L (ref 136–145)
TOTAL PROTEIN: 7 G/DL (ref 6.4–8.2)
TRIGL SERPL-MCNC: 82 MG/DL (ref 0–150)
TSH SERPL DL<=0.05 MIU/L-ACNC: 1.86 UIU/ML (ref 0.27–4.2)
VLDLC SERPL CALC-MCNC: 16 MG/DL
WBC # BLD: 5.6 K/UL (ref 4–11)

## 2020-10-07 PROCEDURE — 80061 LIPID PANEL: CPT

## 2020-10-07 PROCEDURE — 36415 COLL VENOUS BLD VENIPUNCTURE: CPT

## 2020-10-07 PROCEDURE — 80069 RENAL FUNCTION PANEL: CPT

## 2020-10-07 PROCEDURE — 85027 COMPLETE CBC AUTOMATED: CPT

## 2020-10-07 PROCEDURE — 84443 ASSAY THYROID STIM HORMONE: CPT

## 2020-10-07 PROCEDURE — 80076 HEPATIC FUNCTION PANEL: CPT

## 2020-11-01 PROBLEM — Z00.00 MEDICARE ANNUAL WELLNESS VISIT, SUBSEQUENT: Status: RESOLVED | Noted: 2018-09-27 | Resolved: 2020-11-01

## 2020-11-03 ENCOUNTER — OFFICE VISIT (OUTPATIENT)
Dept: DERMATOLOGY | Age: 65
End: 2020-11-03
Payer: MEDICARE

## 2020-11-03 VITALS — TEMPERATURE: 97.5 F

## 2020-11-03 PROCEDURE — 99213 OFFICE O/P EST LOW 20 MIN: CPT | Performed by: DERMATOLOGY

## 2020-11-03 PROCEDURE — 3017F COLORECTAL CA SCREEN DOC REV: CPT | Performed by: DERMATOLOGY

## 2020-11-03 PROCEDURE — 1036F TOBACCO NON-USER: CPT | Performed by: DERMATOLOGY

## 2020-11-03 PROCEDURE — G8484 FLU IMMUNIZE NO ADMIN: HCPCS | Performed by: DERMATOLOGY

## 2020-11-03 PROCEDURE — 1123F ACP DISCUSS/DSCN MKR DOCD: CPT | Performed by: DERMATOLOGY

## 2020-11-03 PROCEDURE — G8417 CALC BMI ABV UP PARAM F/U: HCPCS | Performed by: DERMATOLOGY

## 2020-11-03 PROCEDURE — 1090F PRES/ABSN URINE INCON ASSESS: CPT | Performed by: DERMATOLOGY

## 2020-11-03 PROCEDURE — G8399 PT W/DXA RESULTS DOCUMENT: HCPCS | Performed by: DERMATOLOGY

## 2020-11-03 PROCEDURE — G8427 DOCREV CUR MEDS BY ELIG CLIN: HCPCS | Performed by: DERMATOLOGY

## 2020-11-03 PROCEDURE — 4040F PNEUMOC VAC/ADMIN/RCVD: CPT | Performed by: DERMATOLOGY

## 2020-11-03 NOTE — PROGRESS NOTES
[facility]) Dermatology  [place], [place], [address] [place] [address]  [phone]       Kodak Blakely  1955    76 y.o. female     Date of Visit: 11/3/2020    Chief Complaint:   Chief Complaint   Patient presents with    Skin Exam     tbse, moles.  Skin Lesion     itchy on r back        I was asked to see this patient by Dr. Hicks ref. provider found. History of Present Illness:  Kodak Blakely is a 76 y.o. female who presents with the chief complaint of the followin. Total body skin cancer screening exam. Hx of NMSC, denies recurrence. 2. Many year history of multiple nevi on the head/neck, trunk and extremities, all present for many years. Denies new moles. Denies moles changing in size, shape, color. None associated w/ pain, bleeding, pruritus. 3. New complaint of recurring persistent localized itch to right scapular back for several months. Does admit to history of arthritis to her cervical and thoracic spine. Has not noticed a rash to the area or any new skin lesions to the area involved. Exam a topical lotions to the area currently. Admits to sun exposure in youth without wearing sunscreen, hats, or protective clothing. Wears sun visors when outdoors as well as sunscreen spf 50. Does not use tanning beds. Does sunburn fairly easily if not protected. Review of Systems:  Constitutional: Reports general sense of well-being   Skin: No new or changing moles, no tendency to develop thick scars, no interval of severe sunburns  Heme: No abnormal bruising or bleeding. 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 Patient states she has multiple basal cell carcinomas scattered to her torso and extremities, forgets the exact locations.   All have been treated with surgical excision greater than 5 years ago per patient report. Patient denies past history of melanoma, dysplastic nevi, or chronic skin rashes.     PFHx: sisters-hx of SCC/NMSC    ADDITIONAL HISTORY:    I have reviewed past medical and surgical histories, current medications, allergies, social and family histories as documented in the patient's electronic medical record. Family History   Problem Relation Age of Onset    Heart Disease Mother     Cancer Mother         cervix and parathyroid    Arthritis Mother     Heart Disease Father     Cancer Father         lung and brain mets    Cancer Sister         skin ca - SCC    Arthritis Sister     Cancer Sister         skin ca - NMSC    Cancer Sister         skin ca- NMSC    Arthritis Brother      Past Medical History:   Diagnosis Date    Cancer ([address]     basal cell on left upper chest, SQUAMOUS L FOREARM    Hemangioma     T10    Hyperlipidemia     Hypertension     Osteoarthritis     PONV (postoperative nausea and vomiting)     Prolonged emergence from general anesthesia     POSSIBLY    Shingles     Severe 1985 and 1989 back and right hip, right leg.      Past Surgical History:   Procedure Laterality Date    BACK SURGERY      BREAST SURGERY Right     cyst right    ENDOMETRIAL ABLATION      uterine    FOOT SURGERY      neuroma left foot    HIP SURGERY      JOINT REPLACEMENT Bilateral 2017    RIGHT MARCH/ LEFT JULY; hips    KNEE ARTHROSCOPY      right    SHOULDER SURGERY Right     rotator cuff, BICEP    SHOULDER SURGERY Left 6/4/2020    LEFT REVERSE TOTAL SHOULDER ARTHROPLASTY WITH INTERSCALENE BLOCK FOR PAIN CONTROL          [facility] performed by Gutierrez Dietz MD at [facility]  October 2015    left anterior upper chest    SKIN CANCER EXCISION  2-2016    mid chest area   [facility] SURGERY  01/27/2015    lumbar laminectomy, Dr. Edil Painting  04/1998    cervical     TUMOR REMOVAL      left rib       Allergies   Allergen Reactions    Lipitor Muscle aches    Lisinopril Other (See Comments)     cough    Lovenox [Enoxaparin Sodium] Other (See Comments)     Injection reaction- hematoma resulting in bowel blockage    Tramadol      Drug interaction w/mobic/fluoxetine    Adhesive Tape Rash     Outpatient Medications Marked as Taking for the 11/3/20 encounter (Office Visit) with Octavio Saucedo, DO   Medication Sig Dispense Refill    amLODIPine (NORVASC) 5 MG tablet TAKE 1 TABLET BY MOUTH EVERY DAY 90 tablet 1    gabapentin (NEURONTIN) 400 MG capsule Take 1 capsule by mouth 3 times daily for 90 days.  270 capsule 0    tiZANidine (ZANAFLEX) 4 MG tablet Take 1 tablet by mouth 2 times daily as needed (muscle spasm and chronic pain) 120 tablet 0    meloxicam (MOBIC) 15 MG tablet Take 1 tablet by mouth daily (with food) 3- month supply 90 tablet 0    rosuvastatin (CRESTOR) 20 MG tablet TAKE 1 TABLET BY MOUTH EVERY DAY EVERY NIGHT 90 tablet 1    Loratadine (CLARITIN PO) Take by mouth      CALCIUM/MAGNESIUM/ZINC FORMULA PO Take by mouth nightly      Turmeric 400 MG CAPS Take by mouth daily      Coenzyme Q10 (CO Q 10) 100 MG CAPS Take by mouth daily       FLUoxetine (PROZAC) 10 MG tablet Take 1 tablet by mouth daily 90 tablet 2    aspirin 81 MG tablet Take 81 mg by mouth daily      Multiple Vitamins-Minerals (THERAPEUTIC MULTIVITAMIN-MINERALS) tablet Take 1 tablet by mouth daily         Social History:   Social History     Socioeconomic History    Marital status:      Spouse name: Not on file    Number of children: Not on file    Years of education: Not on file    Highest education level: Not on file   Occupational History    Occupation: Disabled      Comment: 3/2016   Social Needs    Financial resource strain: Not on file    Food insecurity     Worry: Not on file     Inability: Not on file   [employer] needs     Medical: Not on file     Non-medical: Not on file   Tobacco Use    Smoking status: Never Smoker    Smokeless tobacco: Never Used   Substance and Sexual Activity    Alcohol use: Yes     Alcohol/week: 0.0 standard drinks     Comment: occasional glass wine    Drug use: No    Sexual activity: Yes     Partners: Male   Lifestyle    Physical activity     Days per week: Not on file     Minutes per session: Not on file    Stress: Not on file   Relationships    Social connections     Talks on phone: Not on file     Gets together: Not on file     Attends Judaism service: Not on file     Active member of club or organization: Not on file     Attends meetings of clubs or organizations: Not on file     Relationship status: Not on file    Intimate partner violence     Fear of current or ex partner: Not on file     Emotionally abused: Not on file     Physically abused: Not on file     Forced sexual activity: Not on file   Other Topics Concern    Not on file   Social History Narrative    Not on file       Physical Examination     The following were examined and determined to be normal: Psych/Neuro, Scalp/hair, Head/face, Conjunctivae/eyelids, Gums/teeth/lips, Neck, Breast/axilla/chest, Abdomen, RUE, LUE, RLE, LLE and Nails/digits. buttocks. Areas covered by underwear garment(s) not examined. The following were examined and determined to be abnormal: Back. Alvarado phototype: 2    -Constitutional: Well appearing, no acute distress  -Neurological: Alert and oriented X 3  -Mood and Affect: Pleasant  Total body skin exam performed, areas examined listed above:   1. Scattered on the head,neck, trunk and extremities are multiple well-defined round and oval symmetric smoothly-bordered uniformly brown macules and papules. no change in size/shape/color of any lesions; no bleeding lesions. 2.  Right scapular back-few excoriations noted with slight hyperpigmented patch no rash noted  3. Left dorsal forearm- well healed scar at of prior SCC, no evidence of recurrence. Assessment and Plan     1. Multiple benign nevi    2.  Notalgia paresthetica    3. History of squamous cell carcinoma        1. Multiple benign nevi  Benign acquired melanocytic nevi  -Recommend monthly self skin exams   -Educated regarding the ABCDEs of melanoma detection   -Call for any new/changing moles or concerning lesions  -Reviewed sun protective behavior -- sun avoidance during the peak hours of the day, sun-protective clothing (including hat and sunglasses), sunscreen use (water resistant, broad spectrum, SPF at least 30, need for reapplication every 1.5 to 2 hours), avoidance of tanning beds   -Plan: Observation with annual skin checks (earlier if indicated) performed in office to monitor current nevi and to assess for new lesions. 2. Notalgia paresthetica  -Reassurance re: no rash or concerning skin findings on exam, does admit to history of arthritis in the spine may be contributing  -Recommend apply topical OTC sarna lotion TID as needed itch  -keep area well moisturized  -Discussed there may be a nerve irritation component originating from the spinal column (T2-T6) due to arthritis or nerve impringement/bulging intervertebral discs, etc.  Patient advised to follow up with PCP for possible underlying cause    3. History of squamous cell carcinoma  No evidence of recurrence   Skin Care:  Skin cancer is primarily a result of exposure to UV light. Patients with a history of non-melanoma skin cancer should wear sunscreen, sun protective clothing, wide-brimmed hats and practice sun avoidance as much as possible to decrease their risk of developing new skin cancers. Expectations:  Scars from where skin cancers have been removed should be monitored for recurrences. Contact office:  If the patient notices discoloration, bleeding, or a bump arising in a previously healed scar or if a new lesion develops elsewhere. Return to Clinic:  1 year skin exam  Discussed plan with patient and/or primary caretaker. Patient to call clinic with any questions / concerns. Reviewed proper use and side effects of treatment(s) and/or medication(s) with patient and/or primary caretaker. AVS provided or is available on [facility]     Note is transcribed using voice recognition software. Inadvertent computerized transcription errors may be present.

## 2020-11-03 NOTE — PATIENT INSTRUCTIONS
Sun Protection Tips    Apply broad spectrum water resistant sunscreen with an SPF of at least 30 to exposed areas of the skin. Dont forget the ears and lips! Remember to reapply sunscreen about every 2 hours and after swimming or sweating. Wear sun protective clothing. Swim shirts (aka. rash guards) are a great idea and negates the need to reapply sunscreen in those areas. Seek the shade whenever possible especially between the hours of 10am and 4pm when the suns rays are the strongest.     Avoid tanning beds          Wear a wide brim hat while in the sun        Thanks for your visit! Feel free to send  Dr. Jorja Cheadle assistant, Angelic, a [employer] message for any questions, concerns or  to schedule your cosmetic procedures.

## 2020-11-30 ENCOUNTER — OFFICE VISIT (OUTPATIENT)
Dept: ORTHOPEDIC SURGERY | Age: 65
End: 2020-11-30
Payer: MEDICARE

## 2020-11-30 VITALS — BODY MASS INDEX: 28.32 KG/M2 | WEIGHT: 150 LBS | HEIGHT: 61 IN

## 2020-11-30 PROCEDURE — G8399 PT W/DXA RESULTS DOCUMENT: HCPCS | Performed by: PHYSICIAN ASSISTANT

## 2020-11-30 PROCEDURE — 1090F PRES/ABSN URINE INCON ASSESS: CPT | Performed by: PHYSICIAN ASSISTANT

## 2020-11-30 PROCEDURE — G8417 CALC BMI ABV UP PARAM F/U: HCPCS | Performed by: PHYSICIAN ASSISTANT

## 2020-11-30 PROCEDURE — G8427 DOCREV CUR MEDS BY ELIG CLIN: HCPCS | Performed by: PHYSICIAN ASSISTANT

## 2020-11-30 PROCEDURE — 4040F PNEUMOC VAC/ADMIN/RCVD: CPT | Performed by: PHYSICIAN ASSISTANT

## 2020-11-30 PROCEDURE — 1036F TOBACCO NON-USER: CPT | Performed by: PHYSICIAN ASSISTANT

## 2020-11-30 PROCEDURE — 1123F ACP DISCUSS/DSCN MKR DOCD: CPT | Performed by: PHYSICIAN ASSISTANT

## 2020-11-30 PROCEDURE — 99213 OFFICE O/P EST LOW 20 MIN: CPT | Performed by: PHYSICIAN ASSISTANT

## 2020-11-30 PROCEDURE — G8484 FLU IMMUNIZE NO ADMIN: HCPCS | Performed by: PHYSICIAN ASSISTANT

## 2020-11-30 PROCEDURE — 20610 DRAIN/INJ JOINT/BURSA W/O US: CPT | Performed by: PHYSICIAN ASSISTANT

## 2020-11-30 PROCEDURE — 3017F COLORECTAL CA SCREEN DOC REV: CPT | Performed by: PHYSICIAN ASSISTANT

## 2020-11-30 RX ORDER — TRIAMCINOLONE ACETONIDE 40 MG/ML
80 INJECTION, SUSPENSION INTRA-ARTICULAR; INTRAMUSCULAR ONCE
Status: COMPLETED | OUTPATIENT
Start: 2020-11-30 | End: 2020-11-30

## 2020-11-30 RX ORDER — BUPIVACAINE HYDROCHLORIDE 2.5 MG/ML
2 INJECTION, SOLUTION INFILTRATION; PERINEURAL ONCE
Status: COMPLETED | OUTPATIENT
Start: 2020-11-30 | End: 2020-11-30

## 2020-11-30 RX ADMIN — TRIAMCINOLONE ACETONIDE 80 MG: 40 INJECTION, SUSPENSION INTRA-ARTICULAR; INTRAMUSCULAR at 09:11

## 2020-11-30 RX ADMIN — BUPIVACAINE HYDROCHLORIDE 5 MG: 2.5 INJECTION, SOLUTION INFILTRATION; PERINEURAL at 09:11

## 2020-12-02 NOTE — TELEPHONE ENCOUNTER
[facility] [phone] (home)    is requesting refill(s) of medication Gabapentin to preferred pharmacy [facility] [address]    Last OV 10/2/20 (pertaining to medication)   Last refill 8/17/20 (per medication requested)  Next office visit scheduled or attempted Yes  Date 4/5/21  If No, reason MADE. [facility] [phone] (home)    is requesting refill(s) of medication Meloxicam to preferred pharmacy [facility] [address]    Last OV 10/2/20 (pertaining to medication)   Last refill 8/17/20 (per medication requested)  Next office visit scheduled or attempted Yes  Date 4/5/21  If No, reason MADE. These were originally prescribed by Arina Souza but Anjelica said that Dr. Melo Esteban agreed to take both over at her last appt.

## 2020-12-03 RX ORDER — GABAPENTIN 400 MG/1
400 CAPSULE ORAL 3 TIMES DAILY
Qty: 270 CAPSULE | Refills: 0 | Status: SHIPPED | OUTPATIENT
Start: 2020-12-03 | End: 2021-03-03

## 2020-12-03 RX ORDER — MELOXICAM 15 MG/1
15 TABLET ORAL DAILY
Qty: 90 TABLET | Refills: 0 | Status: SHIPPED | OUTPATIENT
Start: 2020-12-03 | End: 2021-02-08

## 2020-12-03 NOTE — TELEPHONE ENCOUNTER
Refill Request gabapentin (NEURONTIN) 400 MG capsule and meloxicam (MOBIC) 15 MG tablet     Last Seen: 10/2/2020    Last Written: 8/17/20 270 capsules with o refills gabapentin  8/17/20 meloxicam 90 tablets with 0 refills    Next Appointment:   Future Appointments   Date Time Provider Fawn Alcocer   1/26/2021 10:30 AM [facility] DEXA [facility]   1/26/2021 11:00 AM [facility] WC MAMMO 2 [facility]   4/5/2021 10:40 AM MD MADHU Barrera  JOJO   10/5/2021 10:00 AM SAFIA Suazo MD [facility]           Requested Prescriptions     Pending Prescriptions Disp Refills    gabapentin (NEURONTIN) 400 MG capsule 270 capsule 0     Sig: Take 1 capsule by mouth 3 times daily for 90 days.     meloxicam (MOBIC) 15 MG tablet 90 tablet 0     Sig: Take 1 tablet by mouth daily (with food) 3- month supply

## 2020-12-03 NOTE — TELEPHONE ENCOUNTER
Patient called to ask for an update on this med refill. I advised her that Dr. Ratna Salter was with patients right now and was out yesterday but that I would send a message back to ask.

## 2020-12-23 ENCOUNTER — TELEPHONE (OUTPATIENT)
Dept: FAMILY MEDICINE CLINIC | Age: 65
End: 2020-12-23

## 2020-12-23 NOTE — TELEPHONE ENCOUNTER
Patient is requesting to get her prescriptions moved to [facility]. Is that something we do on our end or does the patient need to initiate the transfer by calling [facility]?

## 2020-12-28 NOTE — TELEPHONE ENCOUNTER
[facility] as advised and [facility] is telling her that we have to fax the medications one at a time over to them. Fax# [phone]. Is this correct?

## 2021-01-04 NOTE — TELEPHONE ENCOUNTER
Patient is following up again on the medication refills with [facility]. [facility] told Anjelica they need someone to fax over her current medications that she will need refilled through them. Do we fax that over or does her previous pharmacy need to handle that?

## 2021-01-06 NOTE — TELEPHONE ENCOUNTER
Patient called to report that she is just going to go back [facility] for her and her husbands prescriptions because she does not like [facility].

## 2021-01-26 ENCOUNTER — HOSPITAL ENCOUNTER (OUTPATIENT)
Dept: WOMENS IMAGING | Age: 66
Discharge: HOME | End: 2021-01-26
Payer: MEDICARE

## 2021-01-26 DIAGNOSIS — Z12.31 SCREENING MAMMOGRAM, ENCOUNTER FOR: ICD-10-CM

## 2021-01-26 DIAGNOSIS — Z13.820 SCREENING FOR OSTEOPOROSIS: ICD-10-CM

## 2021-01-26 PROCEDURE — 77063 BREAST TOMOSYNTHESIS BI: CPT

## 2021-01-26 PROCEDURE — 77080 DXA BONE DENSITY AXIAL: CPT

## 2021-01-26 RX ORDER — ROSUVASTATIN CALCIUM 20 MG/1
TABLET, COATED ORAL
Qty: 90 TABLET | Refills: 1 | Status: SHIPPED | OUTPATIENT
Start: 2021-01-26

## 2021-02-07 DIAGNOSIS — M25.512 PAIN OF BOTH SHOULDER JOINTS: ICD-10-CM

## 2021-02-07 DIAGNOSIS — M25.511 PAIN OF BOTH SHOULDER JOINTS: ICD-10-CM

## 2021-02-07 DIAGNOSIS — M96.1 POSTLAMINECTOMY SYNDROME OF CERVICAL REGION: ICD-10-CM

## 2021-02-07 DIAGNOSIS — M96.1 POSTLAMINECTOMY SYNDROME OF LUMBAR REGION: ICD-10-CM

## 2021-02-07 DIAGNOSIS — M13.0 POLYARTHROPATHY, MULTIPLE SITES: ICD-10-CM

## 2021-02-08 RX ORDER — MELOXICAM 15 MG/1
15 TABLET ORAL DAILY
Qty: 90 TABLET | Refills: 1 | Status: SHIPPED | OUTPATIENT
Start: 2021-02-08 | End: 2021-08-19

## 2021-08-19 DIAGNOSIS — M13.0 POLYARTHROPATHY, MULTIPLE SITES: ICD-10-CM

## 2021-08-19 DIAGNOSIS — M96.1 POSTLAMINECTOMY SYNDROME OF LUMBAR REGION: ICD-10-CM

## 2021-08-19 DIAGNOSIS — M25.512 PAIN OF BOTH SHOULDER JOINTS: ICD-10-CM

## 2021-08-19 DIAGNOSIS — M96.1 POSTLAMINECTOMY SYNDROME OF CERVICAL REGION: ICD-10-CM

## 2021-08-19 DIAGNOSIS — M25.511 PAIN OF BOTH SHOULDER JOINTS: ICD-10-CM

## 2021-08-19 RX ORDER — MELOXICAM 15 MG/1
15 TABLET ORAL DAILY
Qty: 90 TABLET | Refills: 1 | Status: SHIPPED | OUTPATIENT
Start: 2021-08-19 | End: 2021-11-17

## 2021-09-09 ENCOUNTER — TELEPHONE (OUTPATIENT)
Dept: FAMILY MEDICINE CLINIC | Age: 66
End: 2021-09-09

## 2021-09-09 NOTE — TELEPHONE ENCOUNTER
Patient called to advised she has made the move to [facility] and now established there. I have removed you as PCP.

## 2023-12-07 NOTE — PROGRESS NOTES
Quality 226: Preventive Care And Screening: Tobacco Use: Screening And Cessation Intervention: Patient screened for tobacco use and is an ex/non-smoker Quality 47: Advance Care Plan: Advance Care Planning discussed and documented; advance care plan or surrogate decision maker documented in the medical record. SURGERY  01/27/2015    lumbar laminectomy, Dr. Leobardo Amato  04/1998    cervical     TUMOR REMOVAL      left rib       Allergies   Allergen Reactions    Lipitor      Muscle aches    Tramadol      Drug interaction w/mobic/fluoxetine    Adhesive Tape Rash       Current Outpatient Medications   Medication Sig Dispense Refill    gabapentin (NEURONTIN) 400 MG capsule Take 1 capsule by mouth 3 times daily for 90 days. 270 capsule 0    tiZANidine (ZANAFLEX) 4 MG tablet Take 1 tablet by mouth 2 times daily as needed (muscle spasm and chronic pain) 120 tablet 0    meloxicam (MOBIC) 15 MG tablet Take 1 tablet by mouth daily (with food) 3- month supply 90 tablet 0    amLODIPine (NORVASC) 5 MG tablet Take 1 tablet by mouth daily 30 tablet 5    lisinopril (PRINIVIL;ZESTRIL) 20 MG tablet TAKE 1 TABLET BY MOUTH EVERY DAY 90 tablet 1    CALCIUM/MAGNESIUM/ZINC FORMULA PO Take by mouth nightly      Turmeric 400 MG CAPS Take by mouth daily      mupirocin (BACTROBAN) 2 % ointment Apply small amount to each nostril twice daily starting 5 days prior to surgery. See surgery instructions for more information. 1 Tube 0    benzoyl peroxide (BENZOYL PEROXIDE) 5 % external liquid Wash neck, shoulder and armpit. See surgery instructions for more information. 1 Bottle 0    rosuvastatin (CRESTOR) 20 MG tablet TAKE 1 TABLET BY MOUTH EVERY DAY AT NIGHT (Patient taking differently: Take 20 mg by mouth daily ) 90 tablet 1    Coenzyme Q10 (CO Q 10) 100 MG CAPS Take by mouth daily       FLUoxetine (PROZAC) 10 MG tablet Take 1 tablet by mouth daily 90 tablet 2    aspirin 81 MG tablet Take 81 mg by mouth daily      Glucosa-Chondr-Na Chondr--[id number] MG TABS Take by mouth Indications: 2 tablets daily       Multiple Vitamins-Minerals (THERAPEUTIC MULTIVITAMIN-MINERALS) tablet Take 1 tablet by mouth daily       No current facility-administered medications for this visit.          Prescription pain medication Detail Level: Detailed monitoring:      MEDD current = 0   ORT Score =1   LOW     Other Risk factors - depression. Date of Last Medication Agreement: NA   Date Naloxone prescribed: NA     UDT:    Date of last UDT: NA    Adverse report: No     OARRS:    Checked today: Yes    Adverse report: No     Medication Effects -        Analgesia:      Average level of pain for the past month = 8/10     Percentage of pain relief = 50%     Activities Improved: Activities of daily living = 50%     Adverse Effects: Any adverse effects: No     Type/Severity of side effect: None    Aberrant Behavior:     Any aberrant behavior: No  If yes, describe: None     Controlled Substances Monitoring:    Periodic Controlled Substance Monitoring: No signs of potential drug abuse or diversion identified. , Assessed functional status. Radha Willett MD)    - Informed patient that opioid pain medications may not be phoned into the pharmacy or refilled without being seen. Assessment:      ICD-10-CM    1. Pain of both shoulder joints M25.511 gabapentin (NEURONTIN) 400 MG capsule    M25.512 tiZANidine (ZANAFLEX) 4 MG tablet     meloxicam (MOBIC) 15 MG tablet   2. Postlaminectomy syndrome of lumbar region M96.1 gabapentin (NEURONTIN) 400 MG capsule     tiZANidine (ZANAFLEX) 4 MG tablet     meloxicam (MOBIC) 15 MG tablet   3. Postlaminectomy syndrome of cervical region M96.1 gabapentin (NEURONTIN) 400 MG capsule     tiZANidine (ZANAFLEX) 4 MG tablet     meloxicam (MOBIC) 15 MG tablet   4. Polyarthropathy, multiple sites M13.0 meloxicam (MOBIC) 15 MG tablet   5. Chronic pain syndrome G89.4 gabapentin (NEURONTIN) 400 MG capsule     tiZANidine (ZANAFLEX) 4 MG tablet       Plan:     1. Chronic pain in shoulders >back; no focal changes - returns after >1 year. 2. She is s/p ACDF (1998) and lumbar fusion (2014) with persistent pain . 3. Reports severe allergy/intolerance to opioids and would like to stay off them.   4. Continues meloxicam, tizanidine PRN and

## (undated) DEVICE — SYSTEM SKIN CLSR 22CM DERMBND PRINEO

## (undated) DEVICE — Z DUP USE 2715828 BIT DRL DIA3.2MM PERIPH SCR FOR AEQUALIS PERFORM REVERSED

## (undated) DEVICE — GLOVE ORANGE PI 7 1/2   MSG9075

## (undated) DEVICE — NEEDLE SPNL 20GA L3.5IN YEL HUB S STL REG WALL FIT STYL W/

## (undated) DEVICE — SURGICAL PROCEDURE PACK IV U-BAR

## (undated) DEVICE — PENCIL SMK EVAC ALL IN 1 DSGN ENH VISIBILITY IMPROVED AIR

## (undated) DEVICE — HOOD, PEEL-AWAY: Brand: FLYTE

## (undated) DEVICE — SMARTGOWN SURGICAL GOWN, XL: Brand: CONVERTORS

## (undated) DEVICE — MASK ADLT DISP

## (undated) DEVICE — YANKAUER,BULB TIP,W/O VENT,RIGID,STERILE: Brand: MEDLINE

## (undated) DEVICE — 1016 S-DRAPE IRRIG POUCH 10/BOX: Brand: STERI-DRAPE™

## (undated) DEVICE — COVER,TABLE,HEAVY DUTY,50"X90",STRL: Brand: MEDLINE

## (undated) DEVICE — Z DUP USE 2715602 GUIDEWIRE SURG L220MM DIA25MM SHLDR FOR GLEN KEELED AEQUALIS

## (undated) DEVICE — ARM SLING: Brand: DEROYAL

## (undated) DEVICE — 3M™ STERI-DRAPE™ INSTRUMENT POUCH 1018: Brand: STERI-DRAPE™

## (undated) DEVICE — Device

## (undated) DEVICE — GLOVE ORANGE PI 8   MSG9080

## (undated) DEVICE — 35 ML SYRINGE LUER-LOCK TIP: Brand: MONOJECT

## (undated) DEVICE — STOCKINETTE,IMPERVIOUS,12X48,STERILE: Brand: MEDLINE